# Patient Record
Sex: FEMALE | Race: WHITE | NOT HISPANIC OR LATINO | Employment: FULL TIME | URBAN - METROPOLITAN AREA
[De-identification: names, ages, dates, MRNs, and addresses within clinical notes are randomized per-mention and may not be internally consistent; named-entity substitution may affect disease eponyms.]

---

## 2017-07-28 ENCOUNTER — HOSPITAL ENCOUNTER (OUTPATIENT)
Dept: RADIOLOGY | Facility: OTHER | Age: 62
Discharge: HOME | End: 2017-07-28
Attending: OBSTETRICS & GYNECOLOGY
Payer: COMMERCIAL

## 2017-07-28 VITALS — WEIGHT: 210 LBS | BODY MASS INDEX: 33.75 KG/M2 | HEIGHT: 66 IN

## 2017-07-28 DIAGNOSIS — Z12.31 VISIT FOR SCREENING MAMMOGRAM: ICD-10-CM

## 2017-07-28 PROCEDURE — 77067 SCR MAMMO BI INCL CAD: CPT | Mod: TC

## 2017-07-28 PROCEDURE — 77067 SCR MAMMO BI INCL CAD: CPT | Mod: 26,,, | Performed by: RADIOLOGY

## 2017-07-28 PROCEDURE — 77063 BREAST TOMOSYNTHESIS BI: CPT | Mod: 26,,, | Performed by: RADIOLOGY

## 2023-01-20 DIAGNOSIS — M25.531 RIGHT WRIST PAIN: Primary | ICD-10-CM

## 2023-01-23 ENCOUNTER — TELEPHONE (OUTPATIENT)
Dept: ORTHOPEDICS | Facility: CLINIC | Age: 68
End: 2023-01-23
Payer: MEDICARE

## 2023-01-24 ENCOUNTER — OFFICE VISIT (OUTPATIENT)
Dept: ORTHOPEDICS | Facility: CLINIC | Age: 68
End: 2023-01-24
Payer: MEDICARE

## 2023-01-24 ENCOUNTER — HOSPITAL ENCOUNTER (OUTPATIENT)
Dept: RADIOLOGY | Facility: HOSPITAL | Age: 68
Discharge: HOME | End: 2023-01-24
Attending: PHYSICIAN ASSISTANT
Payer: MEDICARE

## 2023-01-24 DIAGNOSIS — M25.531 RIGHT WRIST PAIN: ICD-10-CM

## 2023-01-24 DIAGNOSIS — M18.11 ARTHRITIS OF CARPOMETACARPAL (CMC) JOINT OF RIGHT THUMB: ICD-10-CM

## 2023-01-24 DIAGNOSIS — M65.4 DE QUERVAIN'S TENOSYNOVITIS, RIGHT: Primary | ICD-10-CM

## 2023-01-24 PROCEDURE — 3288F PR FALLS RISK ASSESSMENT DOCUMENTED: ICD-10-PCS | Mod: CPTII,S$GLB,, | Performed by: PHYSICIAN ASSISTANT

## 2023-01-24 PROCEDURE — 1101F PR PT FALLS ASSESS DOC 0-1 FALLS W/OUT INJ PAST YR: ICD-10-PCS | Mod: CPTII,S$GLB,, | Performed by: PHYSICIAN ASSISTANT

## 2023-01-24 PROCEDURE — 73110 X-RAY EXAM OF WRIST: CPT | Mod: TC,RT

## 2023-01-24 PROCEDURE — 1125F PR PAIN SEVERITY QUANTIFIED, PAIN PRESENT: ICD-10-PCS | Mod: CPTII,S$GLB,, | Performed by: PHYSICIAN ASSISTANT

## 2023-01-24 PROCEDURE — 99203 OFFICE O/P NEW LOW 30 MIN: CPT | Mod: S$GLB,,, | Performed by: PHYSICIAN ASSISTANT

## 2023-01-24 PROCEDURE — 1101F PT FALLS ASSESS-DOCD LE1/YR: CPT | Mod: CPTII,S$GLB,, | Performed by: PHYSICIAN ASSISTANT

## 2023-01-24 PROCEDURE — 99203 PR OFFICE/OUTPT VISIT, NEW, LEVL III, 30-44 MIN: ICD-10-PCS | Mod: S$GLB,,, | Performed by: PHYSICIAN ASSISTANT

## 2023-01-24 PROCEDURE — 99999 PR PBB SHADOW E&M-EST. PATIENT-LVL II: CPT | Mod: PBBFAC,,, | Performed by: PHYSICIAN ASSISTANT

## 2023-01-24 PROCEDURE — 1125F AMNT PAIN NOTED PAIN PRSNT: CPT | Mod: CPTII,S$GLB,, | Performed by: PHYSICIAN ASSISTANT

## 2023-01-24 PROCEDURE — 73110 XR WRIST COMPLETE 3 VIEWS RIGHT: ICD-10-PCS | Mod: 26,RT,, | Performed by: RADIOLOGY

## 2023-01-24 PROCEDURE — 1159F MED LIST DOCD IN RCRD: CPT | Mod: CPTII,S$GLB,, | Performed by: PHYSICIAN ASSISTANT

## 2023-01-24 PROCEDURE — 3288F FALL RISK ASSESSMENT DOCD: CPT | Mod: CPTII,S$GLB,, | Performed by: PHYSICIAN ASSISTANT

## 2023-01-24 PROCEDURE — 73110 X-RAY EXAM OF WRIST: CPT | Mod: 26,RT,, | Performed by: RADIOLOGY

## 2023-01-24 PROCEDURE — 1159F PR MEDICATION LIST DOCUMENTED IN MEDICAL RECORD: ICD-10-PCS | Mod: CPTII,S$GLB,, | Performed by: PHYSICIAN ASSISTANT

## 2023-01-24 PROCEDURE — 99999 PR PBB SHADOW E&M-EST. PATIENT-LVL II: ICD-10-PCS | Mod: PBBFAC,,, | Performed by: PHYSICIAN ASSISTANT

## 2023-01-24 NOTE — PROGRESS NOTES
[facility]  History & Physical  Orthopedics    SUBJECTIVE:      Chief Complaint: Right wrist pain    Referring Provider: Craig Newsome Dr. is the supervising physician for this encounter/patient    History of Present Illness:  Patient is a 67 y.o. right hand dominant female who presents today with complaints of right wrist pain present for 6+ months, no trauma/injury. Pain to radial wrist, worse with gripping/twisting, lifting or pushing through the wrist. 8/10 pain with activity. She did receive a steroid injection to the radial wrist about 6 months ago with outside provider. This was helpful. She has tried Voltaren gel. No bracing. No surgical history on the hands.     Onset of symptoms/DOI was 6+ months.    Symptoms are aggravated by activity and movement.    Symptoms are alleviated by rest.    Symptoms consist of pain.    The patient rates their pain as a 8/10.    Attempted treatment(s) and/or interventions include activity modifications, rest, steroid injection.     The patient denies any fevers, chills, N/V, D/C and presents for evaluation.       No past medical history on file.  Past Surgical History:   Procedure Laterality Date    HYSTERECTOMY      OOPHORECTOMY       Review of patient's allergies indicates:  No Known Allergies  Social History     Social History Narrative    Not on file     No family history on file.    No current outpatient medications on file.      Review of Systems:  Constitutional: no fever or chills  Eyes: no visual changes  ENT: no nasal congestion or sore throat  Respiratory: no cough or shortness of breath  Cardiovascular: no chest pain  Gastrointestinal: no nausea or vomiting, tolerating diet  Musculoskeletal: pain and soreness    OBJECTIVE:      Vital Signs (Most Recent):  There were no vitals filed for this visit.  There is no height or weight on file to calculate BMI.      Physical Exam:  Constitutional: The patient appears well-developed and  well-nourished. No distress.   Skin: No lesions appreciated  Head: Normocephalic and atraumatic.   Nose: Nose normal.   Ears: No deformities seen  Eyes: Conjunctivae and EOM are normal.   Neck: No tracheal deviation present.   Cardiovascular: Normal rate and intact distal pulses.    Pulmonary/Chest: Effort normal. No respiratory distress.   Abdominal: There is no guarding.   Neurological: The patient is alert.   Psychiatric: The patient has a normal mood and affect.     Right Hand/Wrist Examination:    Observation/Inspection:  Swelling  none    Deformity  none  Discoloration  none     Scars   none    Atrophy  none    HAND/WRIST EXAMINATION:  Finkelstein's Test   POS  WHAT Test    POS  Snuff box tenderness   Neg  Bautista's Test    Neg  Hook of Hamate Tenderness  Neg  CMC grind    Neg  Circumduction test   Neg  TTP to the radial styloid    Neurovascular Exam:  Digits WWP, brisk CR < 3s throughout  NVI motor/LTS to M/R/U nerves, radial pulse 2+  Tinel's Test - Carpal Tunnel  Neg  Tinel's Test - Cubital Tunnel  Neg  Phalen's Test    Neg  Median Nerve Compression Test Neg    ROM hand full, painless    ROM wrist full, painless    ROM elbow full, painless    Abdomen not guarded  Respirations nonlabored  Perfusion intact    Diagnostic Results:     Imaging - I independently viewed the patient's imaging as well as the radiology report.      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     Impression:     No fracture dislocation.  Additional findings above.     ASSESSMENT/PLAN:      67 y.o. yo female with Right wrist DeQuervains tendonitis. CMC arthritis on xrays, however appears asymptomatic today.    Plan: The patient and I had a thorough discussion today.  We discussed the working diagnosis as well as several other potential alternative diagnoses.  Treatment options were discussed, both conservative and  surgical.  Conservative treatment options would include things such as activity modifications, workplace modifications, a period of rest, oral vs topical OTC and prescription anti-inflammatory medications, occupational therapy, splinting/bracing, immobilization, corticosteroid injections, and others.  Surgical options were discussed as well.     At this time, the patient would like to proceed with a trial of OT for tendonitis rehab, thumb support brace given today. I do offer repeat CSI today which she declines. Discussed routine voltaren gel massage, ice/heat. RTC on prn basis.    Should the patient's symptoms worsen, persist, or fail to improve they should return for reevaluation and I would be happy to see them back anytime.           Please do not hesitate to reach out to us via email, phone, or MyChart with any questions, concerns, or feedback.

## 2023-01-30 PROBLEM — M65.4 DE QUERVAIN'S TENOSYNOVITIS, RIGHT: Status: ACTIVE | Noted: 2023-01-30

## 2023-01-31 ENCOUNTER — CLINICAL SUPPORT (OUTPATIENT)
Dept: REHABILITATION | Facility: HOSPITAL | Age: 68
End: 2023-01-31
Payer: MEDICARE

## 2023-01-31 DIAGNOSIS — M79.642 CHRONIC PAIN OF LEFT HAND: ICD-10-CM

## 2023-01-31 DIAGNOSIS — M65.4 DE QUERVAIN'S TENOSYNOVITIS, RIGHT: Primary | Chronic | ICD-10-CM

## 2023-01-31 DIAGNOSIS — Z78.9 ALTERATION IN INSTRUMENTAL ACTIVITIES OF DAILY LIVING (IADL): ICD-10-CM

## 2023-01-31 DIAGNOSIS — G89.29 CHRONIC PAIN OF LEFT HAND: ICD-10-CM

## 2023-01-31 PROBLEM — M79.643 CHRONIC HAND PAIN: Status: ACTIVE | Noted: 2023-01-31

## 2023-01-31 PROCEDURE — 97166 OT EVAL MOD COMPLEX 45 MIN: CPT | Mod: PN

## 2023-01-31 NOTE — PATIENT INSTRUCTIONS
- Wear wrist brace during A.M. hours, to be removed for gentle wrist and thumb ROM  - Avoid activities that elicit pain immediately

## 2023-01-31 NOTE — PLAN OF CARE
"  [facility] Occupational Therapy   Hand/Wrist Evaluation      Patient: Chitra Marie  Date of Evaluation: 01/31/2023  Referring Physician: Russo-Digeorge, Jamie L*  Medical Diagnosis: M65.4 (ICD-10-CM) - De Quervain's tenosynovitis, right  Therapy Diagnosis:   Encounter Diagnosis   Name Primary?    De Quervain's tenosynovitis, right      Authorization Expiration Date: TBD  Total Visits Authorized: 1 for evaluation   DOI: Insidious onset of R radial wrist pain for >6 months   Referral Orders: Eval and treat  Plan of Care Certification Period: 1/31/23-3/31/23  Progress Note Due: 2/28/23  FOTO: Initial evaluation     Visit #: 1/1; 6 visits on POC  Start Time: 13:05  End Time: 13:45  Total Billable Time: 40 min    Precautions: standard    Orthopedic Precautions: N/A    No past medical history on file.  No current outpatient medications on file.     No current facility-administered medications for this visit.     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     Impression:     No fracture dislocation.  Additional findings above."    Subjective/Occupational Profile   Age: 67 y.o.  Sex: female    Chitra Marie is a right-hand dominant female who presents to Outpatient Occupational Therapy for evaluation. Pt states, "I started noticing pain near the area where you take your pulse, starting around summer of 2022. It progressively has gotten worse mostly around Thanksgiving. It wasn't pain that I couldn't take- but it just got much worse and was happening more. I have trouble holding cups because of pain ans also pushing myself up from the bath and from a seated position."     Home/Environmental History: Pt resides w/ her son. Pt and son share household management. "     Assistance level to complete ADLs:  Feeding: Modified Independent  Bathing: Modified Independent  Toileting: Modified Independent  Ambulating: Independent  Grooming: Independent  Dressing upper body: Modified Independent  Dressing lower body: Modified Independent  Meal preparation: Modified Independent  Taking/Managing medications: Independent    Work History: Pt is retired.     Driving Status: Pt is able to drive self with (I).     Activity Level: Lightly active     Date/Mechanism of Injury: Insidious onset of R radial wrist pain since approx. Summer 2022    Involved areas: right dorsolateral wrist     Functional Pain Scale Rating 0-10:   At Rest:  1/10   With Activity: 7/10-8/10    Chitra 's goals for therapy are: Decreased pain and Increased functional use      Objective     Hand dominance: right  Observation: Skin intact and dry  Sensation: Radial Nerve Intact    Special Tests:   Finkelstein's Test:  positive (5/10)   Grind Test:  positive (3/10)   ScaphoLunate Ballotment Test: negative    Edema: Circumferential measurements: as follows:   Location: RUE  Proximal Wrist Crease: 16.5 cm  (L: 16 cm)     Range of Motion: right active  B digit and wrist ROM are WNL and symmetrical (B)  Note: 2/10-3/10 pain reported w/ R wrist flexion, extension, UD, and thumb palmar ABD      Manual Muscle Test: deferred at this time  Muscle Strength     Strength: (RANDY Dynamometer in lbs), Average 3 trials, Position II  Right: 37.2 lbs  Left: 35.2 lbs    Pinch Strength: (Pinch Gauge in lbs), Average 3 trials  Lateral/Quinn Pinch       L) 10.3   R) 10.9  2pt Pinch                    L) 6.9     R) 6    Problem List: functional limitations: Patient presents with the following functional limitations:   Self Care / ADL: opening containers, bathing  Work/Activities/Household management tasks: cooking, cleaning   Leisure: ceramics     Treatment     Home Exercise Program/Education:  Issued HEP (see patient instructions in EMR) and  "educated on brace wear & modality use for pain management . Chitra demonstrated good  understanding of the education provided.      Patient/Family Education: role of OT, goals for OT, scheduling/cancellations - Pt verbalized understanding. Discussed insurance limitations with patient.      Outcome Measure: FOTO  CMS Impairment/Limitation/Restriction for FOTO  Upper Extremity Survey    Therapist reviewed FOTO scores for Chitra Marie on 1/31/2023.   FOTO documents entered into EPIC - see Media section.    Limitation Score: 63%  Category: Self Care           History Examination Decision Making Complexity Score   Occupational Profile:   Pt's full occupational profile reviewed , including OD(surgical notes), including report of previous therapies.    Medical and Therapy History:     Please see "Plan" section for reference of therapy goals.    Pt with Hx/o  - See Subjective/Occupational Profile     Brief/expanded review:  Expanded              Performance Deficits     Physical    Please see under "problem list" and the assessment portion of this eval note      Cognitive  WNL      Psychosocial:    Pt with ability to work at this time using just one or both hand.    Rating: mild  Treatment to include :  paraffin, fluidotherapy, manual therapy/joint mobilizations,scar massage,   modalities for pain management, iontophoresis with dexamethasone, US 3mhz, therapeutic exercises/activities,        strengthening,       Clinical decision  Making of moderate  complexity, mild  modifications for required to complete eval      Rating:moderate Moderate in combination of the 3 categories      Problem List:   Decreased function of Right UE, Increased pain, Decreased strength, Inability to perform work/tasks, and Inability to perform leisure activiites    Assessment   Chitra Marie was referred to Outpatient Occupational Therapy with diagnosis of   Encounter Diagnosis   Name Primary?    De Quervain's tenosynovitis, right     " presents with the functional limitations as described in the problem list above. Patient can benefit from Outpatient Occupational Therapy services to maximize functional use of her R hand to facilitate performance of ADLs and IADLs. The following goals were discussed with the Patient and she is in agreement with them as to be addressed in the treatment plan.     Anticipated barriers to Occupational Therapy: None noted     Pt has no cultural, educational or language barriers to learning provided.      ShortTerm Goals (to be met in 3 weeks or by 2/21/23):   1. Patient to be IND and compliant with HEP & attendance for duration of therapy.  2. Patient will report no more than 4/10 pain in R wrist/hand.       Long Term Goals (to be met in 6 weeks or by DC):   1. Patient to be IND and compliant with HEP & attendance for duration of therapy.  2. Patient will report increase in functional use of her R wrist/hand by 25% as evidenced by the FOTO outcome measure.   3. Patient will report no more than 2/10 pain in R wrist/hand.       Plan   Chitra Marie is to be treated by Occupational Therapy 1 time per week for 6 weeks, or 6 visits, during the certification period from 1/31/23 to 3/31/23, to achieve the established goals.       Treatment to include: Paraffin/hot packs, manual therapy/joint mobilizations, modalities for pain management, edema control, joint protection, energy conservation, therapeutic exercises, therapeutic activities, and implementation of a personalized Home Exercise Program (HEP), as well as any other treatments deemed necessary based on the patient's needs or progress.       Thank you for allowing me to assist in the care of your Patient. If you have any questions or concerns, please don't hesitate to e-mail or contact me directly.      MARY Palacios/L  [facility]   Phone: [phone]  Fax: [phone]

## 2023-02-03 ENCOUNTER — CLINICAL SUPPORT (OUTPATIENT)
Dept: REHABILITATION | Facility: HOSPITAL | Age: 68
End: 2023-02-03
Payer: MEDICARE

## 2023-02-03 DIAGNOSIS — Z78.9 ALTERATION IN INSTRUMENTAL ACTIVITIES OF DAILY LIVING (IADL): ICD-10-CM

## 2023-02-03 DIAGNOSIS — G89.29 CHRONIC PAIN OF RIGHT HAND: ICD-10-CM

## 2023-02-03 DIAGNOSIS — M65.4 DE QUERVAIN'S TENOSYNOVITIS, RIGHT: Primary | ICD-10-CM

## 2023-02-03 DIAGNOSIS — M79.641 CHRONIC PAIN OF RIGHT HAND: ICD-10-CM

## 2023-02-03 PROCEDURE — 97035 APP MDLTY 1+ULTRASOUND EA 15: CPT | Mod: PN

## 2023-02-03 PROCEDURE — 97140 MANUAL THERAPY 1/> REGIONS: CPT | Mod: PN

## 2023-02-03 PROCEDURE — 97530 THERAPEUTIC ACTIVITIES: CPT | Mod: PN

## 2023-02-03 PROCEDURE — 97018 PARAFFIN BATH THERAPY: CPT | Mod: PN,59

## 2023-02-04 NOTE — PROGRESS NOTES
"  [facility]  Occupational Therapy Daily Treatment Note    Date: 2/6/2023  Name: Chitra Marie  Clinic Number: [id number]    Therapy Diagnosis:   Encounter Diagnoses   Name Primary?    De Quervain's tenosynovitis, right Yes    Alteration in instrumental activities of daily living (IADL)     Chronic pain of right hand      Physician: Russo-Digeorge, Jamie L*    Physician Orders: Eval and treat   Medical Diagnosis: M65.4 (ICD-10-CM) - De Quervain's tenosynovitis, right  Date of Injury/Onset: Insidious onset of R radial wrist pain for >6 months   Evaluation Date: 1/31/23  Insurance Authorization Period Expiration: 2/28/23  Plan of Care Expiration: 1/31/23-3/31/23  Progress Note Due: 2/28/23   Date of Return to MD: TBD  Visit # / Visits authorized: 2/12 authorized visits / 6 visits on POC   FOTO: initial evaluation     Precautions:  Standard  Orthopedic Precautions: N/A    Time In: 13:49  Time Out: 14:35  Total Billable Time: 46 minutes      SUBJECTIVE     Pt reports: "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 I had some mild pain with the painting portion, it hurt a little more when I was scraping."     She was compliant with home exercise program given last session.     Response to previous treatment: favorable     Functional change: Increased ability to perform leisure activities with less pain     Pain: 1/10  Location: right dorsolateral wrist     OBJECTIVE   Objective Measures updated at progress report unless specified.    Edema: Circumferential measurements: as follows:   Location: RUE  Proximal Wrist Crease: 16.5 cm  (L: 16 cm)      Range of Motion: right active  B digit and wrist ROM are WNL and symmetrical (B)  Note: 2/10-3/10 pain reported w/ R wrist flexion, extension, UD, and thumb palmar ABD        Manual Muscle Test: deferred at this time  Muscle Strength      Strength: (RANDY Dynamometer in lbs), Average 3 trials, Position II  Right: 37.2 lbs  Left: 35.2 lbs     Pinch Strength: " (Pinch Gauge in lbs), Average 3 trials  Lateral/Quinn Pinch       L) 10.3   R) 10.9  2pt Pinch                    L) 6.9     R) 6      Treatment     Chitra received the following treatments listed below:     Supervised modalities after being cleared for contradictions for 7 minutes:   -Paraffin bath to R hand/wrist to increase blood flow, circulation, pain management and for tissue elasticity prior to therex.       Direct contact modalities after being cleared for contraindications for 8 minutes:  -Utrasound for scar tissue remodeling, increased circulation, & tissue elasticity @ 100% duty cycle, 3.3 Mhz, applied to R dorsolateral wrist/forearm, intensity = 0.6 w/cm2       Manual therapy techniques to increase joint mobilization and soft tissue mobilization for 23 minutes:   -Soft tissue massage (STM) and myofascial release (MFR) to R orearm/wrist/hand, Grade I/II joint mobs to wrist joint for 23 minutes to increase joint mobility, ROM and for pain management.       Therapeutic activities/exercises to restore functional performance while increasing strength, endurance, ROM, and flexibility for 8 minutes, including:  --Kinesiotape applied to R hand/wrist/forearm in order to decrease dorsolateral wrist pain; 2 I cuts; 50%-75% tension; anchored at dorsal thumb P2 and lateral elbow/proximal forearm. Pt educated that tape may be left on for five days, can shower with tape. Remove with cold water and/or lotion/oil.  - Review of home program     Patient Education and Home Exercises      Education provided:   - Importance of compliance w/ HEP to maximize functional gains   - Progress towards goals     Written Home Exercises Provided: yes.  Exercises were reviewed and Chitra was able to demonstrate them prior to the end of the session.  Chitra demonstrated good  understanding of the HEP provided. See EMR under Patient Instructions for exercises provided during previous therapy sessions:     ASSESSMENT     Pt tolerated  session well, noting decreased overall pain levels reported since last session with increased ability noted to perform leisure activities with less pain.      Chitra is progressing well towards her goals and there are no updates to goals at this time. Pt prognosis is Good.     Pt will continue to benefit from skilled Outpatient Occupational Therapy to address the deficits listed in the problem list on initial evaluation, provide Pt/family education and to maximize Pt's level of independence in the home and community environment.     Pt's spiritual, cultural and educational needs considered and Pt agreeable to Plan of Care and goals.    Anticipated barriers to occupational therapy: None noted       ShortTerm Goals (to be met in 3 weeks or by 2/21/23):   1. Patient to be IND and compliant with HEP & attendance for duration of therapy.  2. Patient will report no more than 4/10 pain in R wrist/hand.         Long Term Goals (to be met in 6 weeks or by DC):   1. Patient to be IND and compliant with HEP & attendance for duration of therapy.  2. Patient will report increase in functional use of her R wrist/hand by 25% as evidenced by the FOTO outcome measure.   3. Patient will report no more than 2/10 pain in R wrist/hand.            PLAN     Continue Skilled Occupational Therapy with individualized Plan of Care to address the above outlined treatment goals for 1 time per week for 6 weeks, or 6 visits, during the certification period from 1/31/23 to 3/31/23.       Updates/Grading for next session: TBD pending progress     YISEL Palacios

## 2023-02-06 ENCOUNTER — CLINICAL SUPPORT (OUTPATIENT)
Dept: REHABILITATION | Facility: HOSPITAL | Age: 68
End: 2023-02-06
Payer: MEDICARE

## 2023-02-06 DIAGNOSIS — M79.641 CHRONIC PAIN OF RIGHT HAND: ICD-10-CM

## 2023-02-06 DIAGNOSIS — Z78.9 ALTERATION IN INSTRUMENTAL ACTIVITIES OF DAILY LIVING (IADL): ICD-10-CM

## 2023-02-06 DIAGNOSIS — G89.29 CHRONIC PAIN OF RIGHT HAND: ICD-10-CM

## 2023-02-06 DIAGNOSIS — M65.4 DE QUERVAIN'S TENOSYNOVITIS, RIGHT: Primary | ICD-10-CM

## 2023-02-06 PROCEDURE — 97035 APP MDLTY 1+ULTRASOUND EA 15: CPT | Mod: PN

## 2023-02-06 PROCEDURE — 97140 MANUAL THERAPY 1/> REGIONS: CPT | Mod: PN

## 2023-02-06 PROCEDURE — 97530 THERAPEUTIC ACTIVITIES: CPT | Mod: PN

## 2023-02-06 PROCEDURE — 97018 PARAFFIN BATH THERAPY: CPT | Mod: PN,59

## 2023-02-10 NOTE — PROGRESS NOTES
"  [facility]  Occupational Therapy Daily Treatment Note    Date: 2/13/2023  Name: Chitra Marie  Clinic Number: [id number]    Therapy Diagnosis:   Encounter Diagnoses   Name Primary?    De Quervain's tenosynovitis, right Yes    Alteration in instrumental activities of daily living (IADL)     Chronic pain of right hand      Physician: Russo-Digeorge, Jamie L*    Physician Orders: Eval and treat   Medical Diagnosis: M65.4 (ICD-10-CM) - De Quervain's tenosynovitis, right  Date of Injury/Onset: Insidious onset of R radial wrist pain for >6 months   Evaluation Date: 1/31/23  Insurance Authorization Period Expiration: 2/28/23  Plan of Care Expiration: 1/31/23-3/31/23; 6 visits on POC   Progress Note Due: 2/28/23   Date of Return to MD: TBD  Visit # / Visits authorized: 3/12 authorized visits  FOTO: initial evaluation     Precautions:  Standard  Orthopedic Precautions: N/A    Time In: 13:45  Time Out: 14:35  Total Billable Time: 50 minutes      SUBJECTIVE     Pt reports: "This week has been a little more increased pain than the weeks before. 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     Response to previous treatment: N/A    Functional change: Increased ability to perform leisure activities with less pain     Pain: 1/10  Location: right dorsolateral wrist     OBJECTIVE   Objective Measures updated at progress report unless specified.    Edema: Circumferential measurements: as follows:   Location: RUE  Proximal Wrist Crease: 16.5 cm  (L: 16 cm)      Range of Motion: right active  B digit and wrist ROM are WNL and symmetrical (B)  Note: 2/10-3/10 pain reported w/ R wrist flexion, extension, UD, and thumb palmar ABD        Manual Muscle Test: deferred at this time  Muscle Strength      Strength: (RANDY Dynamometer in lbs), Average 3 trials, Position II  Right: 37.2 lbs  Left: 35.2 lbs     Pinch Strength: (Pinch Gauge in lbs), " Average 3 trials  Lateral/Quinn Pinch       L) 10.3   R) 10.9  2pt Pinch                    L) 6.9     R) 6      Treatment     Chitra received the following treatments listed below:     Supervised modalities after being cleared for contradictions for 7 minutes:   -Paraffin bath to R hand/wrist to increase blood flow, circulation, pain management and for tissue elasticity prior to therex.       Direct contact modalities after being cleared for contraindications for 8 minutes:  -Utrasound for scar tissue remodeling, increased circulation, & tissue elasticity @ 100% duty cycle, 3.3 Mhz, applied to R dorsolateral wrist/forearm, intensity = 0.6 w/cm2       Manual therapy techniques to increase joint mobilization and soft tissue mobilization for 23 minutes:   -Soft tissue massage (STM) and myofascial release (MFR) to R orearm/wrist/hand, Grade I/II joint mobs to wrist joint to increase joint mobility, ROM and for pain management.       Therapeutic activities/exercises to restore functional performance while increasing strength, endurance, ROM, and flexibility for 12 minutes, including:  -Review of joint protection and energy conservation methods   - Education on difference between SSRN impingment vs. DeQuervain's tenosynovitis    Patient Education and Home Exercises      Education provided:   - Importance of compliance w/ HEP to maximize functional gains   - Progress towards goals     Written Home Exercises Provided: Continued w/ previous HEP.   Exercises were reviewed and Chitra was able to demonstrate them prior to the end of the session.  Chitra demonstrated good  understanding of the HEP provided. See EMR under Patient Instructions for exercises provided during previous therapy sessions:     ASSESSMENT     Pt tolerated session well, noting mild increase in dorsolateral wrist/thumb pain reported since last week, however noting that she has continued to perform leisure tasks such as ceramics with concurrent pain.         Chitra is progressing well towards her goals and there are no updates to goals at this time. Pt prognosis is Good.     Pt will continue to benefit from skilled Outpatient Occupational Therapy to address the deficits listed in the problem list on initial evaluation, provide Pt/family education and to maximize Pt's level of independence in the home and community environment.     Pt's spiritual, cultural and educational needs considered and Pt agreeable to Plan of Care and goals.    Anticipated barriers to occupational therapy: None noted       ShortTerm Goals (to be met in 3 weeks or by 2/21/23):   1. Patient to be IND and compliant with HEP & attendance for duration of therapy.  2. Patient will report no more than 4/10 pain in R wrist/hand.         Long Term Goals (to be met in 6 weeks or by DC):   1. Patient to be IND and compliant with HEP & attendance for duration of therapy.  2. Patient will report increase in functional use of her R wrist/hand by 25% as evidenced by the FOTO outcome measure.   3. Patient will report no more than 2/10 pain in R wrist/hand.            PLAN     Continue Skilled Occupational Therapy with individualized Plan of Care to address the above outlined treatment goals for 1 time per week for 6 weeks, or 6 visits, during the certification period from 1/31/23 to 3/31/23.       Updates/Grading for next session: TBD pending progress     YISEL Palacios

## 2023-02-13 ENCOUNTER — CLINICAL SUPPORT (OUTPATIENT)
Dept: REHABILITATION | Facility: HOSPITAL | Age: 68
End: 2023-02-13
Payer: MEDICARE

## 2023-02-13 DIAGNOSIS — G89.29 CHRONIC PAIN OF RIGHT HAND: ICD-10-CM

## 2023-02-13 DIAGNOSIS — Z78.9 ALTERATION IN INSTRUMENTAL ACTIVITIES OF DAILY LIVING (IADL): ICD-10-CM

## 2023-02-13 DIAGNOSIS — M79.641 CHRONIC PAIN OF RIGHT HAND: ICD-10-CM

## 2023-02-13 DIAGNOSIS — M65.4 DE QUERVAIN'S TENOSYNOVITIS, RIGHT: Primary | ICD-10-CM

## 2023-02-13 PROCEDURE — 97530 THERAPEUTIC ACTIVITIES: CPT | Mod: PN

## 2023-02-13 PROCEDURE — 97140 MANUAL THERAPY 1/> REGIONS: CPT | Mod: PN

## 2023-02-13 PROCEDURE — 97018 PARAFFIN BATH THERAPY: CPT | Mod: PN

## 2023-02-13 PROCEDURE — 97035 APP MDLTY 1+ULTRASOUND EA 15: CPT | Mod: PN

## 2023-02-23 NOTE — PROGRESS NOTES
"  [facility]  Occupational Therapy Progress & Daily Treatment Note    Date: 2/27/2023  Name: Chitra Marie  Clinic Number: [id number]    Therapy Diagnosis:   Encounter Diagnoses   Name Primary?    De Quervain's tenosynovitis, right Yes    Alteration in instrumental activities of daily living (IADL)     Chronic pain of right hand      Physician: Russo-Digeorge, Jamie L*    Physician Orders: Eval and treat   Medical Diagnosis: M65.4 (ICD-10-CM) - De Quervain's tenosynovitis, right  Date of Injury/Onset: Insidious onset of R radial wrist pain for >6 months   Evaluation Date: 1/31/23  Insurance Authorization Period Expiration: 2/28/23  Plan of Care Expiration: 1/31/23-3/31/23; 6 visits on POC   Progress Note Due: 2/28/23   Date of Return to MD: TBD  Visit # / Visits authorized: 4/12 authorized visits  FOTO: initial evaluation, 5th visit    Precautions:  Standard  Orthopedic Precautions: N/A    Time In: 13:50  Time Out: 14:30  Total Billable Time: 40 minutes      SUBJECTIVE     Pt reports: "My wrist has been hurting a bit more the past week. I feel a little bump near where my wrist and thumb meet up. I've been wearing the brace- actually both day and night- I know you just said to wear it during the day, but I felt like one night it felt better to wear it at night- so I've been wearing every night since."     She was compliant with home exercise program given last session.     Response to previous treatment: N/A    Functional change: Increased ability to perform leisure activities with less pain     Pain: 2/10  Location: right dorsolateral wrist     OBJECTIVE   Objective Measures updated at progress report unless specified.    Edema: Circumferential measurements: as follows:   Location: RUE  Proximal Wrist Crease: 16.5 cm  (L: 16 cm)      Range of Motion: right active  B digit and wrist ROM are WNL and symmetrical (B)  Note: 2/10-3/10 pain reported w/ R wrist flexion, extension, UD, and " thumb palmar ABD        Manual Muscle Test: deferred at this time  Muscle Strength      Strength: (RANDY Dynamometer in lbs), Average 3 trials, Position II  Right: 33.5 lbs (-4 lbs)  Left: 35.2 lbs    Note: increased dorsolateral wrist pain reported with  testing      Pinch Strength: (Pinch Gauge in lbs), Average 3 trials  Lateral/Quinn Pinch       L) 10.3   R) 10.9  2pt Pinch                    L) 6.9     R) 6      Treatment     Chitra received the following treatments listed below:     Re-Assessment performed x 23 min       Direct contact modalities after being cleared for contraindications for 8 minutes:  -Utrasound for scar tissue remodeling, increased circulation, & tissue elasticity @ 100% duty cycle, 3.3 Mhz, applied to R dorsolateral wrist/forearm, intensity = 0.6 w/cm2       Manual therapy techniques to increase joint mobilization and soft tissue mobilization for 9 minutes:   -Soft tissue massage (STM) and myofascial release (MFR) to R orearm/wrist/hand, Grade I/II joint mobs to wrist joint to increase joint mobility, ROM and for pain management.       Patient Education and Home Exercises      Education provided:   - Importance of compliance w/ HEP to maximize functional gains   - Kinesiotape application to L dorsal wrist to assist with support of joint and pain     Written Home Exercises Provided: Continued w/ previous HEP.   Exercises were reviewed and Chitra was able to demonstrate them prior to the end of the session.  Chitra demonstrated good  understanding of the HEP provided. See EMR under Patient Instructions for exercises provided during previous therapy sessions:     ASSESSMENT     Pt tolerated session well, noting slight increase in pain reported over the past 2 weeks, w/ partial compliance noted with brace wear. P   Chitra is progressing slowly towards her goals and updates to goals at this time are noted below. Pt prognosis is Good.     Pt will continue to benefit from skilled  Outpatient Occupational Therapy to address the deficits listed in the problem list on initial evaluation, provide Pt/family education and to maximize Pt's level of independence in the home and community environment.     Pt's spiritual, cultural and educational needs considered and Pt agreeable to Plan of Care and goals.    Anticipated barriers to occupational therapy: None noted       ShortTerm Goals (to be met in 3 weeks or by 2/21/23):   1. Patient to be IND and compliant with HEP & attendance for duration of therapy. In progress   2. Patient will report no more than 4/10 pain in R wrist/hand. Goal Met 2/27        Long Term Goals (to be met in 6 weeks or by DC):   1. Patient to be IND and compliant with HEP & attendance for duration of therapy.  2. Patient will report increase in functional use of her R wrist/hand by 25% as evidenced by the FOTO outcome measure.   3. Patient will report no more than 2/10 pain in R wrist/hand.      PLAN     Continue Skilled Occupational Therapy with individualized Plan of Care to address the above outlined treatment goals for 1 time per week for 6 weeks, or 6 visits, during the certification period from 1/31/23 to 3/31/23.       Updates/Grading for next session: TBD pending progress       YISEL Palacios

## 2023-02-27 ENCOUNTER — CLINICAL SUPPORT (OUTPATIENT)
Dept: REHABILITATION | Facility: HOSPITAL | Age: 68
End: 2023-02-27
Payer: MEDICARE

## 2023-02-27 DIAGNOSIS — Z78.9 ALTERATION IN INSTRUMENTAL ACTIVITIES OF DAILY LIVING (IADL): ICD-10-CM

## 2023-02-27 DIAGNOSIS — M65.4 DE QUERVAIN'S TENOSYNOVITIS, RIGHT: Primary | ICD-10-CM

## 2023-02-27 DIAGNOSIS — G89.29 CHRONIC PAIN OF RIGHT HAND: ICD-10-CM

## 2023-02-27 DIAGNOSIS — M79.641 CHRONIC PAIN OF RIGHT HAND: ICD-10-CM

## 2023-02-27 PROCEDURE — 97140 MANUAL THERAPY 1/> REGIONS: CPT | Mod: PN

## 2023-02-27 PROCEDURE — 97530 THERAPEUTIC ACTIVITIES: CPT | Mod: PN

## 2023-02-27 PROCEDURE — 97035 APP MDLTY 1+ULTRASOUND EA 15: CPT | Mod: PN

## 2023-03-05 NOTE — PROGRESS NOTES
"  [facility]  Occupational Therapy Daily Treatment Note    Date: 3/6/2023  Name: Chitra Marie  Clinic Number: [id number]    Therapy Diagnosis:   Encounter Diagnoses   Name Primary?    De Quervain's tenosynovitis, right Yes    Alteration in instrumental activities of daily living (IADL)     Chronic pain of right hand      Physician: Russo-Digeorge, Jamie L*    Physician Orders: Eval and treat   Medical Diagnosis: M65.4 (ICD-10-CM) - De Quervain's tenosynovitis, right  Date of Injury/Onset: Insidious onset of R radial wrist pain for >6 months   Evaluation Date: 1/31/23  Insurance Authorization Period Expiration: 6/30/23  Plan of Care Expiration: 1/31/23-3/31/23; 6 visits on POC   Progress Note Due: 2/28/23   Date of Return to MD: TBD  Visit # / Visits authorized: 5/24 authorized visits  FOTO: initial evaluation, 5th visit    Precautions:  Standard  Orthopedic Precautions: N/A    Time In: 13:50  Time Out: 14:38  Total Billable Time: 48 minutes      SUBJECTIVE     Pt reports: "My thumb/wrist is feeling a little better. I've been wearing the brace during daytime."     She was compliant with home exercise program given last session.     Response to previous treatment: N/A    Functional change: Increased ability to perform leisure activities with less pain     Pain: 3/10  Location: right dorsolateral wrist     OBJECTIVE   Objective Measures updated at progress report unless specified.    Treatment     Chitra received the following treatments listed below:    Supervised modalities after being cleared for contradictions for 8 minutes:   -Moist heat application to R forearm/wrist/hand to facilitate tissue extensibility & ROM prior to therex    Direct contact modalities after being cleared for contraindications for 8 minutes:  -Utrasound for scar tissue remodeling, increased circulation, & tissue elasticity @ 100% duty cycle, 3.3 Mhz, applied to R dorsolateral wrist/forearm, intensity = 0.6 " w/cm2       Manual therapy techniques to increase joint mobilization and soft tissue mobilization for 23 minutes:   -Soft tissue massage (STM) and myofascial release (MFR) to R orearm/wrist/hand, Grade I/II joint mobs to wrist joint to increase joint mobility, ROM and for pain management.       Therapeutic activities/exercises to restore functional performance while increasing strength, endurance, ROM, and flexibility for 9 minutes, including:  - Wrist stabilization exercises: isometric wrist flex/ext/RD/UD w/ 1# free weight x 2 min each  - Rest breaks throughout  - Education on difference between SSRN impingment vs. DeQuervain's tenosynovitis       Patient Education and Home Exercises      Education provided:   - Importance of compliance w/ HEP to maximize functional gains   - Kinesiotape application to L dorsal wrist to assist with support of joint and pain     Written Home Exercises Provided: Continued w/ previous HEP.   Exercises were reviewed and Chitra was able to demonstrate them prior to the end of the session.  Chitra demonstrated good  understanding of the HEP provided. See EMR under Patient Instructions for exercises provided during previous therapy sessions:     ASSESSMENT     Pt tolerated session well, noting mild decrease in pain levels since last week, and improved compliance w/ brace wear.   Chitra is progressing slowly towards her goals and there are no updates to goals at this time. Pt prognosis is Good.     Pt will continue to benefit from skilled Outpatient Occupational Therapy to address the deficits listed in the problem list on initial evaluation, provide Pt/family education and to maximize Pt's level of independence in the home and community environment.     Pt's spiritual, cultural and educational needs considered and Pt agreeable to Plan of Care and goals.    Anticipated barriers to occupational therapy: None noted       ShortTerm Goals (to be met in 3 weeks or by 2/21/23):   1.  Patient to be IND and compliant with HEP & attendance for duration of therapy. In progress   2. Patient will report no more than 4/10 pain in R wrist/hand. Goal Met 2/27        Long Term Goals (to be met in 6 weeks or by DC):   1. Patient to be IND and compliant with HEP & attendance for duration of therapy.  2. Patient will report increase in functional use of her R wrist/hand by 25% as evidenced by the FOTO outcome measure.   3. Patient will report no more than 2/10 pain in R wrist/hand.      PLAN     Continue Skilled Occupational Therapy with individualized Plan of Care to address the above outlined treatment goals for 1 time per week for 6 weeks, or 6 visits, during the certification period from 1/31/23 to 3/31/23.       Updates/Grading for next session: TBD pending progress       YISEL Palacios

## 2023-03-06 ENCOUNTER — CLINICAL SUPPORT (OUTPATIENT)
Dept: REHABILITATION | Facility: HOSPITAL | Age: 68
End: 2023-03-06
Payer: MEDICARE

## 2023-03-06 DIAGNOSIS — M65.4 DE QUERVAIN'S TENOSYNOVITIS, RIGHT: Primary | ICD-10-CM

## 2023-03-06 DIAGNOSIS — G89.29 CHRONIC PAIN OF RIGHT HAND: ICD-10-CM

## 2023-03-06 DIAGNOSIS — Z78.9 ALTERATION IN INSTRUMENTAL ACTIVITIES OF DAILY LIVING (IADL): ICD-10-CM

## 2023-03-06 DIAGNOSIS — M79.641 CHRONIC PAIN OF RIGHT HAND: ICD-10-CM

## 2023-03-06 PROCEDURE — 97010 HOT OR COLD PACKS THERAPY: CPT | Mod: PN

## 2023-03-06 PROCEDURE — 97530 THERAPEUTIC ACTIVITIES: CPT | Mod: PN

## 2023-03-06 PROCEDURE — 97140 MANUAL THERAPY 1/> REGIONS: CPT | Mod: PN

## 2023-03-06 PROCEDURE — 97035 APP MDLTY 1+ULTRASOUND EA 15: CPT | Mod: PN

## 2023-03-10 NOTE — PROGRESS NOTES
"  [facility]  Occupational Therapy Daily Treatment Note    Date: 3/13/2023  Name: Chitra Marie  Clinic Number: [id number]    Therapy Diagnosis:   Encounter Diagnoses   Name Primary?    De Quervain's tenosynovitis, right Yes    Alteration in instrumental activities of daily living (IADL)     Chronic pain of right hand      Physician: Russo-Digeorge, Jamie L*    Physician Orders: Eval and treat   Medical Diagnosis: M65.4 (ICD-10-CM) - De Quervain's tenosynovitis, right  Date of Injury/Onset: Insidious onset of R radial wrist pain for >6 months   Evaluation Date: 1/31/23  Insurance Authorization Period Expiration: 6/30/23  Plan of Care Expiration: 1/31/23-3/31/23; 6 visits on POC   Progress Note Due: 3/31/23   Date of Return to MD: TBD  Visit # / Visits authorized: 6/24 authorized visits  FOTO: initial evaluation, 5th visit    Precautions:  Standard  Orthopedic Precautions: N/A    Time In: 13:51  Time Out: 14:37  Total Billable Time: 46 minutes      SUBJECTIVE     Pt reports: "I woke up last Wednesday with no pain- and just had some stiffness more through the top. I had ceramics this morning. I'm starting to think I need to get the little nodule removed."     She was compliant with home exercise program given last session.     Response to previous treatment: N/A    Functional change: Increased ability to perform leisure activities with less pain     Pain: 2/10  Location: right dorsolateral wrist     OBJECTIVE   Objective Measures updated at progress report unless specified.    Treatment     Chitra received the following treatments listed below:    Supervised modalities after being cleared for contradictions for 7 minutes:   -Moist heat application to R forearm/wrist/hand to facilitate tissue extensibility & ROM prior to therex    Direct contact modalities after being cleared for contraindications for 8 minutes:  -Utrasound for scar tissue remodeling, increased circulation, & tissue " elasticity @ 100% duty cycle, 3.3 Mhz, applied to R dorsolateral wrist/forearm, intensity = 0.6 w/cm2       Manual therapy techniques to increase joint mobilization and soft tissue mobilization for 23 minutes:   -Soft tissue massage (STM) and myofascial release (MFR) to R orearm/wrist/hand, Grade I/II joint mobs to wrist joint to increase joint mobility, ROM and for pain management.       Therapeutic activities/exercises (as part of HEP) to restore functional performance while increasing strength, endurance, ROM, and flexibility for 8 minutes, including:  - Wrist stabilization exercises: isometric wrist flex/ext/RD/UD w/ 1# free weight x 2 min each  - Rest breaks throughout       Patient Education and Home Exercises      Education provided:   - Importance of compliance w/ HEP to maximize functional gains     Written Home Exercises Provided: Continued w/ previous HEP.   Exercises were reviewed and Chitra was able to demonstrate them prior to the end of the session.  Chitra demonstrated good  understanding of the HEP provided. See EMR under Patient Instructions for exercises provided during previous therapy sessions.     ASSESSMENT     Pt tolerated session well, noting mild pain reported through dorsal wrist only, w/ majority of localized pain noted at small, bony spur site on dorsolateral wrist. Chitra is progressing slowly towards her goals and there are no updates to goals at this time. Pt prognosis is Good.     Pt will continue to benefit from skilled Outpatient Occupational Therapy to address the deficits listed in the problem list on initial evaluation, provide Pt/family education and to maximize Pt's level of independence in the home and community environment.     Pt's spiritual, cultural and educational needs considered and Pt agreeable to Plan of Care and goals.    Anticipated barriers to occupational therapy: None noted       ShortTerm Goals (to be met in 3 weeks or by 2/21/23):   1. Patient to be  IND and compliant with HEP & attendance for duration of therapy. In progress   2. Patient will report no more than 4/10 pain in R wrist/hand. Goal Met 2/27        Long Term Goals (to be met in 6 weeks or by DC):   1. Patient to be IND and compliant with HEP & attendance for duration of therapy.  2. Patient will report increase in functional use of her R wrist/hand by 25% as evidenced by the FOTO outcome measure.   3. Patient will report no more than 2/10 pain in R wrist/hand.      PLAN     Continue Skilled Occupational Therapy with individualized Plan of Care to address the above outlined treatment goals for 1 time per week for 6 weeks, or 6 visits, during the certification period from 1/31/23 to 3/31/23.       Updates/Grading for next session: TBD pending progress       YISEL Palacios

## 2023-03-13 ENCOUNTER — CLINICAL SUPPORT (OUTPATIENT)
Dept: REHABILITATION | Facility: HOSPITAL | Age: 68
End: 2023-03-13
Payer: MEDICARE

## 2023-03-13 DIAGNOSIS — G89.29 CHRONIC PAIN OF RIGHT HAND: ICD-10-CM

## 2023-03-13 DIAGNOSIS — M65.4 DE QUERVAIN'S TENOSYNOVITIS, RIGHT: Primary | ICD-10-CM

## 2023-03-13 DIAGNOSIS — M79.641 CHRONIC PAIN OF RIGHT HAND: ICD-10-CM

## 2023-03-13 DIAGNOSIS — Z78.9 ALTERATION IN INSTRUMENTAL ACTIVITIES OF DAILY LIVING (IADL): ICD-10-CM

## 2023-03-13 PROCEDURE — 97140 MANUAL THERAPY 1/> REGIONS: CPT | Mod: PN

## 2023-03-13 PROCEDURE — 97035 APP MDLTY 1+ULTRASOUND EA 15: CPT | Mod: PN

## 2023-03-13 PROCEDURE — 97010 HOT OR COLD PACKS THERAPY: CPT | Mod: PN

## 2023-03-13 PROCEDURE — 97530 THERAPEUTIC ACTIVITIES: CPT | Mod: PN

## 2023-03-17 NOTE — PROGRESS NOTES
"  [facility]  Occupational Therapy Daily Treatment Note    Date: 3/20/2023  Name: Chitra Marie  Clinic Number: [id number]    Therapy Diagnosis:   Encounter Diagnoses   Name Primary?    De Quervain's tenosynovitis, right Yes    Alteration in instrumental activities of daily living (IADL)     Chronic pain of right hand      Physician: Russo-Digeorge, Jamie L*    Physician Orders: Eval and treat   Medical Diagnosis: M65.4 (ICD-10-CM) - De Quervain's tenosynovitis, right  Date of Injury/Onset: Insidious onset of R radial wrist pain for >6 months   Evaluation Date: 1/31/23  Insurance Authorization Period Expiration: 6/30/23  Plan of Care Expiration: 1/31/23-3/31/23; 6 visits on POC   Progress Note Due: 3/31/23   Date of Return to MD: TBD  Visit # / Visits authorized: 7/24 authorized visits  FOTO: initial evaluation, 5th visit    Precautions:  Standard  Orthopedic Precautions: N/A    Time In: 13:50  Time Out: 14:35  Total Billable Time: 45 minutes      SUBJECTIVE     Pt reports: "The pain pretty much stays around a  2 or 3- I get random little sharp pains with certain things."     She was compliant with home exercise program given last session.     Response to previous treatment: N/A    Functional change: Increased ability to perform leisure activities with less pain     Pain: 2/10  Location: right dorsolateral wrist     OBJECTIVE     Objective Measures updated at progress report unless specified.    Treatment     Chitra received the following treatments listed below:    Supervised modalities after being cleared for contradictions for 6 minutes:   -Moist heat application to R forearm/wrist/hand to facilitate tissue extensibility & ROM prior to therex    Direct contact modalities after being cleared for contraindications for 8 minutes:  -Utrasound for scar tissue remodeling, increased circulation, & tissue elasticity @ 100% duty cycle, 3.3 Mhz, applied to R dorsolateral wrist/forearm, " intensity = 0.6 w/cm2       Manual therapy techniques to increase joint mobilization and soft tissue mobilization for 23 minutes:   -Soft tissue massage (STM) and myofascial release (MFR) to R orearm/wrist/hand, Grade I/II joint mobs to wrist joint to increase joint mobility, ROM and for pain management.       Therapeutic activities/exercises (as part of HEP) to restore functional performance while increasing strength, endurance, ROM, and flexibility for 8 minutes, including:  - Wrist stabilization exercises: isometric wrist flex/ext/RD/UD w/ 1# free weight x 2 min each  - Rest breaks throughout       Patient Education and Home Exercises      Education provided:   - Importance of compliance w/ HEP to maximize functional gains     Written Home Exercises Provided: Continued w/ previous HEP.   Exercises were reviewed and Chitra was able to demonstrate them prior to the end of the session.  Chitra demonstrated good  understanding of the HEP provided. See EMR under Patient Instructions for exercises provided during previous therapy sessions.     ASSESSMENT     Pt tolerated session well, noting mild pain reported through dorsal wrist only, w/ majority of localized pain noted at small, bony spur site on dorsolateral wrist. Chitra is progressing slowly towards her goals and there are no updates to goals at this time. Pt prognosis is Good.     Pt will continue to benefit from skilled Outpatient Occupational Therapy to address the deficits listed in the problem list on initial evaluation, provide Pt/family education and to maximize Pt's level of independence in the home and community environment.     Pt's spiritual, cultural and educational needs considered and Pt agreeable to Plan of Care and goals.    Anticipated barriers to occupational therapy: None noted       ShortTerm Goals (to be met in 3 weeks or by 2/21/23):   1. Patient to be IND and compliant with HEP & attendance for duration of therapy. In progress   2.  Patient will report no more than 4/10 pain in R wrist/hand. Goal Met 2/27        Long Term Goals (to be met in 6 weeks or by DC):   1. Patient to be IND and compliant with HEP & attendance for duration of therapy.  2. Patient will report increase in functional use of her R wrist/hand by 25% as evidenced by the FOTO outcome measure.   3. Patient will report no more than 2/10 pain in R wrist/hand.      PLAN     Continue Skilled Occupational Therapy with individualized Plan of Care to address the above outlined treatment goals for 1 time per week for 6 weeks, or 6 visits, during the certification period from 1/31/23 to 3/31/23.       Updates/Grading for next session: TBD pending progress       YISEL Palacios

## 2023-03-20 ENCOUNTER — CLINICAL SUPPORT (OUTPATIENT)
Dept: REHABILITATION | Facility: HOSPITAL | Age: 68
End: 2023-03-20
Payer: MEDICARE

## 2023-03-20 DIAGNOSIS — Z78.9 ALTERATION IN INSTRUMENTAL ACTIVITIES OF DAILY LIVING (IADL): ICD-10-CM

## 2023-03-20 DIAGNOSIS — G89.29 CHRONIC PAIN OF RIGHT HAND: ICD-10-CM

## 2023-03-20 DIAGNOSIS — M65.4 DE QUERVAIN'S TENOSYNOVITIS, RIGHT: Primary | ICD-10-CM

## 2023-03-20 DIAGNOSIS — M79.641 CHRONIC PAIN OF RIGHT HAND: ICD-10-CM

## 2023-03-20 PROCEDURE — 97010 HOT OR COLD PACKS THERAPY: CPT | Mod: PN

## 2023-03-20 PROCEDURE — 97035 APP MDLTY 1+ULTRASOUND EA 15: CPT | Mod: PN

## 2023-03-20 PROCEDURE — 97140 MANUAL THERAPY 1/> REGIONS: CPT | Mod: PN

## 2023-03-20 PROCEDURE — 97530 THERAPEUTIC ACTIVITIES: CPT | Mod: PN

## 2023-03-24 NOTE — PROGRESS NOTES
"  [facility]  Occupational Therapy Progress Note/Discharge Summary     Date: 3/27/2023  Name: Chitra Marie  Clinic Number: [id number]    Therapy Diagnosis:   Encounter Diagnoses   Name Primary?    De Quervain's tenosynovitis, right Yes    Alteration in instrumental activities of daily living (IADL)     Chronic pain of right hand      Physician: Russo-Digeorge, Jamie L*    Physician Orders: Eval and treat   Medical Diagnosis: M65.4 (ICD-10-CM) - De Quervain's tenosynovitis, right  Date of Injury/Onset: Insidious onset of R radial wrist pain for >6 months   Evaluation Date: 1/31/23  Insurance Authorization Period Expiration: 6/30/23  Plan of Care Expiration: 1/31/23-3/31/23; 6 visits on POC   Progress Note Due: 3/31/23   Date of Return to MD: TBD  Visit # / Visits authorized: 8/24 authorized visits  FOTO: initial evaluation, 5th visit, 9th visit        Precautions:  Standard  Orthopedic Precautions: N/A    Time In: 13:45  Time Out: 14:30  Total Billable Time: 45 minutes      SUBJECTIVE     Pt reports: "My wrist pain is more moderate today- and has been like that over the weekend. I don't think I was doing any more than I usually do. It's still this one little spot- it's so painful to the touch where that nodule is."     She was compliant with home exercise program given last session.     Response to previous treatment: N/A    Functional change: Increased ability to perform leisure activities with less pain     Pain: 4/10-6/10   Location: right dorsolateral wrist     OBJECTIVE     Objective Measures updated at progress report unless specified.    Special Tests:   Finkelstein's Test:  positive (5/10)   Grind Test: negative   ScaphoLunate Ballotment Test: negative     Edema: Circumferential measurements: as follows:   Location: RUE  Proximal Wrist Crease: 17 cm (+0.5 cm)    (L: 16 cm)      Range of Motion: right active  B digit and wrist ROM are WNL and symmetrical (B)  Note: Mild increase " in pain reported w/ R flexion & UD         Manual Muscle Test: deferred at this time  Muscle Strength        Strength: (RANDY Dynamometer in lbs), Average 3 trials, Position II  Right: 41.2 lbs (+4 lbs)   Left: 35.2 lbs     Pinch Strength: (Pinch Gauge in lbs), Average 3 trials  Lateral/Quinn Pinch       L) 10.3   R) 10.9 (same)   2pt Pinch                    L) 6.9     R) 7.5 (+1.5)     Note: Dorsolateral wrist pain reported w/ 2 pt pinches        Outcome Measure: FOTO  CMS Impairment/Limitation/Restriction for FOTO  Upper Extremity Survey     Therapist reviewed FOTO scores for Chitra Marie on 3/27/2023.   FOTO documents entered into [employer] - see Media section.     Limitation Score: 57% (6% improvement)   Category: Self Care            Treatment     Chitra received the following treatments listed below:    Re-Assessment performed x 15 minutes        Manual therapy techniques to increase joint mobilization and soft tissue mobilization for 15 minutes:   -Soft tissue massage (STM) and myofascial release (MFR) to R orearm/wrist/hand, Grade I/II joint mobs to wrist joint to increase joint mobility, ROM and for pain management.       Therapeutic activities (as part of HEP) to restore functional performance while increasing strength, endurance, ROM, and flexibility for 15 minutes, including:  - Review of education on joint protection and energy conservation methods     Patient Education and Home Exercises      Education provided:   - Progress towards goals     Written Home Exercises Provided: Continued w/ previous HEP.   Exercises were reviewed and Chitra was able to demonstrate them prior to the end of the session.  Chitra demonstrated good  understanding of the HEP provided. See EMR under Patient Instructions for exercises provided during previous therapy sessions.     ASSESSMENT     Pt has met 1 out of 3 LTG established for therapy, noting no improvement in dorsolateral wrist pain reported since initial  evaluation with partial compliance with bracing and resting from aggravating tasks reported concurrently. Pt will follow-up with referring provider to discuss additional treatment options.     Pt's spiritual, cultural and educational needs considered and Pt agreeable to Plan of Care and goals.    Anticipated barriers to occupational therapy: None noted       ShortTerm Goals (to be met in 3 weeks or by 2/21/23):   1. Patient to be IND and compliant with HEP & attendance for duration of therapy. In progress   2. Patient will report no more than 4/10 pain in R wrist/hand. Goal Met 2/27        Long Term Goals (to be met in 6 weeks or by DC):   1. Patient to be IND and compliant with HEP & attendance for duration of therapy. Goal Met   2. Patient will report increase in functional use of her R wrist/hand by 25% as evidenced by the FOTO outcome measure. Not Met   3. Patient will report no more than 2/10 pain in R wrist/hand. Not Met      PLAN     Patient is appropriate for discharge from Outpatient Occupational Therapy at this time and will follow-up with ortho specialist to discuss bone spur and lipoma removal.       YISEL Palacios

## 2023-03-27 ENCOUNTER — CLINICAL SUPPORT (OUTPATIENT)
Dept: REHABILITATION | Facility: HOSPITAL | Age: 68
End: 2023-03-27
Payer: MEDICARE

## 2023-03-27 DIAGNOSIS — Z78.9 ALTERATION IN INSTRUMENTAL ACTIVITIES OF DAILY LIVING (IADL): ICD-10-CM

## 2023-03-27 DIAGNOSIS — M79.641 CHRONIC PAIN OF RIGHT HAND: ICD-10-CM

## 2023-03-27 DIAGNOSIS — M65.4 DE QUERVAIN'S TENOSYNOVITIS, RIGHT: Primary | ICD-10-CM

## 2023-03-27 DIAGNOSIS — G89.29 CHRONIC PAIN OF RIGHT HAND: ICD-10-CM

## 2023-03-27 PROCEDURE — 97530 THERAPEUTIC ACTIVITIES: CPT | Mod: PN

## 2023-03-27 PROCEDURE — 97140 MANUAL THERAPY 1/> REGIONS: CPT | Mod: PN

## 2023-04-05 ENCOUNTER — TELEPHONE (OUTPATIENT)
Dept: ORTHOPEDICS | Facility: CLINIC | Age: 68
End: 2023-04-05
Payer: MEDICARE

## 2023-04-05 NOTE — TELEPHONE ENCOUNTER
Called and spoke with the pt in regard a f/u appt with Mauricio AMAYA. Informed that next availability and pt would like to confirm it. I scheduled her appt on 04/28/23 at 8.30 am at the [facility] on the 5 th floor and informed pt. Pt verbalized understanding and was thankful.

## 2023-04-05 NOTE — TELEPHONE ENCOUNTER
----- Message from Ky Purdy sent at 4/5/2023 10:40 AM CDT -----  Name of Who is Calling: GOLDY GIL [[id number]]           What is the request in detail: PT stated that she would like to discuss on going pain with the office and get an sooner appt before 5/29/2023.Please contact to further discuss and advise.            Can the clinic reply by [facility]: NO           [facility] Back [facility]: [phone]

## 2023-04-28 ENCOUNTER — OFFICE VISIT (OUTPATIENT)
Dept: ORTHOPEDICS | Facility: CLINIC | Age: 68
End: 2023-04-28
Payer: MEDICARE

## 2023-04-28 DIAGNOSIS — M65.4 DE QUERVAIN'S TENOSYNOVITIS, RIGHT: Primary | ICD-10-CM

## 2023-04-28 PROCEDURE — 1159F PR MEDICATION LIST DOCUMENTED IN MEDICAL RECORD: ICD-10-PCS | Mod: CPTII,S$GLB,, | Performed by: PHYSICIAN ASSISTANT

## 2023-04-28 PROCEDURE — 99999 PR PBB SHADOW E&M-EST. PATIENT-LVL II: ICD-10-PCS | Mod: PBBFAC,,, | Performed by: PHYSICIAN ASSISTANT

## 2023-04-28 PROCEDURE — 3288F FALL RISK ASSESSMENT DOCD: CPT | Mod: CPTII,S$GLB,, | Performed by: PHYSICIAN ASSISTANT

## 2023-04-28 PROCEDURE — 1101F PR PT FALLS ASSESS DOC 0-1 FALLS W/OUT INJ PAST YR: ICD-10-PCS | Mod: CPTII,S$GLB,, | Performed by: PHYSICIAN ASSISTANT

## 2023-04-28 PROCEDURE — 1125F AMNT PAIN NOTED PAIN PRSNT: CPT | Mod: CPTII,S$GLB,, | Performed by: PHYSICIAN ASSISTANT

## 2023-04-28 PROCEDURE — 99214 OFFICE O/P EST MOD 30 MIN: CPT | Mod: S$GLB,,, | Performed by: PHYSICIAN ASSISTANT

## 2023-04-28 PROCEDURE — 99214 PR OFFICE/OUTPT VISIT, EST, LEVL IV, 30-39 MIN: ICD-10-PCS | Mod: S$GLB,,, | Performed by: PHYSICIAN ASSISTANT

## 2023-04-28 PROCEDURE — 99999 PR PBB SHADOW E&M-EST. PATIENT-LVL II: CPT | Mod: PBBFAC,,, | Performed by: PHYSICIAN ASSISTANT

## 2023-04-28 PROCEDURE — 4010F ACE/ARB THERAPY RXD/TAKEN: CPT | Mod: CPTII,S$GLB,, | Performed by: PHYSICIAN ASSISTANT

## 2023-04-28 PROCEDURE — 1159F MED LIST DOCD IN RCRD: CPT | Mod: CPTII,S$GLB,, | Performed by: PHYSICIAN ASSISTANT

## 2023-04-28 PROCEDURE — 4010F PR ACE/ARB THEARPY RXD/TAKEN: ICD-10-PCS | Mod: CPTII,S$GLB,, | Performed by: PHYSICIAN ASSISTANT

## 2023-04-28 PROCEDURE — 1125F PR PAIN SEVERITY QUANTIFIED, PAIN PRESENT: ICD-10-PCS | Mod: CPTII,S$GLB,, | Performed by: PHYSICIAN ASSISTANT

## 2023-04-28 PROCEDURE — 1101F PT FALLS ASSESS-DOCD LE1/YR: CPT | Mod: CPTII,S$GLB,, | Performed by: PHYSICIAN ASSISTANT

## 2023-04-28 PROCEDURE — 3288F PR FALLS RISK ASSESSMENT DOCUMENTED: ICD-10-PCS | Mod: CPTII,S$GLB,, | Performed by: PHYSICIAN ASSISTANT

## 2023-04-28 RX ORDER — CEFAZOLIN SODIUM 2 G/50ML
2 SOLUTION INTRAVENOUS
Status: CANCELLED | OUTPATIENT
Start: 2023-04-28

## 2023-04-28 RX ORDER — MUPIROCIN 20 MG/G
OINTMENT TOPICAL
Status: CANCELLED | OUTPATIENT
Start: 2023-04-28

## 2023-04-28 NOTE — PROGRESS NOTES
[facility]  History & Physical  Orthopedics    SUBJECTIVE:      Chief Complaint: Right wrist pain    Referring Provider: No ref. provider found     Dr. Echeverria is the supervising physician for this encounter/patient    History of Present Illness:  Patient is a 68 y.o. right hand dominant female who presents today with complaints of right wrist pain present for 6+ months, no trauma/injury. Pain to radial wrist, worse with gripping/twisting, lifting or pushing through the wrist. 8/10 pain with activity. She did receive a steroid injection to the radial wrist about 6 months ago with outside provider. This was helpful. She has tried Voltaren gel. No bracing. No surgical history on the hands.     Interval History 4/28/23: the patient returns for continued treatment of her right wrist pain. Previously treated with DeQuervains steroid injection with outside provider which did help moderately with pain for short period of time. She has been doing OT for this since January 2023 with some improvement as well. However, pain is lingering and she is now ready to pursue surgery.    Onset of symptoms/DOI was 10+ months.    Symptoms are aggravated by activity and movement.    Symptoms are alleviated by rest.    Symptoms consist of pain.    The patient rates their pain as a 3/10    Attempted treatment(s) and/or interventions include activity modifications, rest, steroid injection, bracing, occupational therapy.     The patient denies any fevers, chills, N/V, D/C and presents for evaluation.       No past medical history on file.  Past Surgical History:   Procedure Laterality Date    HYSTERECTOMY      OOPHORECTOMY       Review of patient's allergies indicates:  No Known Allergies  Social History     Social History Narrative    Not on file     No family history on file.    No current outpatient medications on file.      Review of Systems:  Constitutional: no fever or chills  Eyes: no visual changes  ENT: no nasal  congestion or sore throat  Respiratory: no cough or shortness of breath  Cardiovascular: no chest pain  Gastrointestinal: no nausea or vomiting, tolerating diet  Musculoskeletal: pain and soreness    OBJECTIVE:      Vital Signs (Most Recent):  There were no vitals filed for this visit.  There is no height or weight on file to calculate BMI.      Physical Exam:  Constitutional: The patient appears well-developed and well-nourished. No distress.   Skin: No lesions appreciated  Head: Normocephalic and atraumatic.   Nose: Nose normal.   Ears: No deformities seen  Eyes: Conjunctivae and EOM are normal.   Neck: No tracheal deviation present.   Cardiovascular: Normal rate and intact distal pulses.    Pulmonary/Chest: Effort normal. No respiratory distress.   Abdominal: There is no guarding.   Neurological: The patient is alert.   Psychiatric: The patient has a normal mood and affect.     Right Hand/Wrist Examination:    Observation/Inspection:  Swelling  none    Deformity  none  Discoloration  none     Scars   none    Atrophy  none    HAND/WRIST EXAMINATION:  Finkelstein's Test   Neg today  WHAT Test    POS  Snuff box tenderness   Neg  Bautista's Test    Neg  Hook of Hamate Tenderness  Neg  CMC grind    Neg  Circumduction test   Neg  TTP to the radial styloid and first dorsal compartment.    Neurovascular Exam:  Digits WWP, brisk CR < 3s throughout  NVI motor/LTS to M/R/U nerves, radial pulse 2+  Tinel's Test - Carpal Tunnel  Neg  Tinel's Test - Cubital Tunnel  Neg  Phalen's Test    Neg  Median Nerve Compression Test Neg    ROM hand full, painless    ROM wrist full, painless    ROM elbow full, painless    Abdomen not guarded  Respirations nonlabored  Perfusion intact    Diagnostic Results:     Imaging - I independently viewed the patient's imaging as well as the radiology report.      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     Impression:     No fracture dislocation.  Additional findings above.     ASSESSMENT/PLAN:      68 y.o. yo female with Right wrist DeQuervains tendonitis. CMC arthritis on xrays, however appears asymptomatic today.    Plan: The patient and I had a thorough discussion today.  We discussed the working diagnosis as well as several other potential alternative diagnoses.  Treatment options were discussed, both conservative and surgical.  Conservative treatment options would include things such as activity modifications, workplace modifications, a period of rest, oral vs topical OTC and prescription anti-inflammatory medications, occupational therapy, splinting/bracing, immobilization, corticosteroid injections, and others.  Surgical options were discussed as well.     At this time, the patient would like to proceed with surgery. She is consented for Right DeQuervains release with Dr. Echeverria on 5/17/23. Case ordered for [place].    The patient has not responded to adequate non operative treatment at this time and/or non operative treatment is not indicated. Thus, the risks, benefits and alternatives to surgery were discussed with the patient in detail.  Specific risks include but are not limited to bleeding, infection, vessel and/or nerve damage, pain, numbness, tingling, complex regional pain syndrome, compartment syndrome, failure to return to pre-injury and/or preoperative functional status, scar sensitivity, delayed healing, inability to return to work, pulley injury, tendon injury, bowstringing, partial and/or incomplete relief of symptoms, weakness, persistence of and/or worsening of symptoms, surgical failure, osteomyelitis, amputation, loss of function, stiffness, functional debility, dysfunction, decreased  strength, need for prolonged postoperative rehabilitation, need for further surgery, deep venous thrombosis, pulmonary embolism, arthritis and death.  The patient states an  understanding and wishes to proceed with surgery.   All questions were answered.  No guarantees were implied or stated.  Written informed consent was obtained.    Should the patient's symptoms worsen, persist, or fail to improve they should return for reevaluation and I would be happy to see them back anytime.           Please do not hesitate to reach out to us via email, phone, or MyChart with any questions, concerns, or feedback.

## 2023-04-28 NOTE — H&P (VIEW-ONLY)
[facility]  History & Physical  Orthopedics    SUBJECTIVE:      Chief Complaint: Right wrist pain    Referring Provider: No ref. provider found     Dr. Echeverria is the supervising physician for this encounter/patient    History of Present Illness:  Patient is a 68 y.o. right hand dominant female who presents today with complaints of right wrist pain present for 6+ months, no trauma/injury. Pain to radial wrist, worse with gripping/twisting, lifting or pushing through the wrist. 8/10 pain with activity. She did receive a steroid injection to the radial wrist about 6 months ago with outside provider. This was helpful. She has tried Voltaren gel. No bracing. No surgical history on the hands.     Interval History 4/28/23: the patient returns for continued treatment of her right wrist pain. Previously treated with DeQuervains steroid injection with outside provider which did help moderately with pain for short period of time. She has been doing OT for this since January 2023 with some improvement as well. However, pain is lingering and she is now ready to pursue surgery.    Onset of symptoms/DOI was 10+ months.    Symptoms are aggravated by activity and movement.    Symptoms are alleviated by rest.    Symptoms consist of pain.    The patient rates their pain as a 3/10    Attempted treatment(s) and/or interventions include activity modifications, rest, steroid injection, bracing, occupational therapy.     The patient denies any fevers, chills, N/V, D/C and presents for evaluation.       No past medical history on file.  Past Surgical History:   Procedure Laterality Date    HYSTERECTOMY      OOPHORECTOMY       Review of patient's allergies indicates:  No Known Allergies  Social History     Social History Narrative    Not on file     No family history on file.    No current outpatient medications on file.      Review of Systems:  Constitutional: no fever or chills  Eyes: no visual changes  ENT: no nasal  congestion or sore throat  Respiratory: no cough or shortness of breath  Cardiovascular: no chest pain  Gastrointestinal: no nausea or vomiting, tolerating diet  Musculoskeletal: pain and soreness    OBJECTIVE:      Vital Signs (Most Recent):  There were no vitals filed for this visit.  There is no height or weight on file to calculate BMI.      Physical Exam:  Constitutional: The patient appears well-developed and well-nourished. No distress.   Skin: No lesions appreciated  Head: Normocephalic and atraumatic.   Nose: Nose normal.   Ears: No deformities seen  Eyes: Conjunctivae and EOM are normal.   Neck: No tracheal deviation present.   Cardiovascular: Normal rate and intact distal pulses.    Pulmonary/Chest: Effort normal. No respiratory distress.   Abdominal: There is no guarding.   Neurological: The patient is alert.   Psychiatric: The patient has a normal mood and affect.     Right Hand/Wrist Examination:    Observation/Inspection:  Swelling  none    Deformity  none  Discoloration  none     Scars   none    Atrophy  none    HAND/WRIST EXAMINATION:  Finkelstein's Test   Neg today  WHAT Test    POS  Snuff box tenderness   Neg  Bautista's Test    Neg  Hook of Hamate Tenderness  Neg  CMC grind    Neg  Circumduction test   Neg  TTP to the radial styloid and first dorsal compartment.    Neurovascular Exam:  Digits WWP, brisk CR < 3s throughout  NVI motor/LTS to M/R/U nerves, radial pulse 2+  Tinel's Test - Carpal Tunnel  Neg  Tinel's Test - Cubital Tunnel  Neg  Phalen's Test    Neg  Median Nerve Compression Test Neg    ROM hand full, painless    ROM wrist full, painless    ROM elbow full, painless    Abdomen not guarded  Respirations nonlabored  Perfusion intact    Diagnostic Results:     Imaging - I independently viewed the patient's imaging as well as the radiology report.      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     Impression:     No fracture dislocation.  Additional findings above.     ASSESSMENT/PLAN:      68 y.o. yo female with Right wrist DeQuervains tendonitis. CMC arthritis on xrays, however appears asymptomatic today.    Plan: The patient and I had a thorough discussion today.  We discussed the working diagnosis as well as several other potential alternative diagnoses.  Treatment options were discussed, both conservative and surgical.  Conservative treatment options would include things such as activity modifications, workplace modifications, a period of rest, oral vs topical OTC and prescription anti-inflammatory medications, occupational therapy, splinting/bracing, immobilization, corticosteroid injections, and others.  Surgical options were discussed as well.     At this time, the patient would like to proceed with surgery. She is consented for Right DeQuervains release with Dr. Echeverria on 5/17/23. Case ordered for [place].    The patient has not responded to adequate non operative treatment at this time and/or non operative treatment is not indicated. Thus, the risks, benefits and alternatives to surgery were discussed with the patient in detail.  Specific risks include but are not limited to bleeding, infection, vessel and/or nerve damage, pain, numbness, tingling, complex regional pain syndrome, compartment syndrome, failure to return to pre-injury and/or preoperative functional status, scar sensitivity, delayed healing, inability to return to work, pulley injury, tendon injury, bowstringing, partial and/or incomplete relief of symptoms, weakness, persistence of and/or worsening of symptoms, surgical failure, osteomyelitis, amputation, loss of function, stiffness, functional debility, dysfunction, decreased  strength, need for prolonged postoperative rehabilitation, need for further surgery, deep venous thrombosis, pulmonary embolism, arthritis and death.  The patient states an  understanding and wishes to proceed with surgery.   All questions were answered.  No guarantees were implied or stated.  Written informed consent was obtained.    Should the patient's symptoms worsen, persist, or fail to improve they should return for reevaluation and I would be happy to see them back anytime.           Please do not hesitate to reach out to us via email, phone, or MyChart with any questions, concerns, or feedback.

## 2023-05-01 ENCOUNTER — PATIENT MESSAGE (OUTPATIENT)
Dept: ADMINISTRATIVE | Facility: OTHER | Age: 68
End: 2023-05-01
Payer: MEDICARE

## 2023-05-08 ENCOUNTER — ANESTHESIA EVENT (OUTPATIENT)
Dept: SURGERY | Facility: HOSPITAL | Age: 68
End: 2023-05-08
Payer: MEDICARE

## 2023-05-16 ENCOUNTER — TELEPHONE (OUTPATIENT)
Dept: ORTHOPEDICS | Facility: CLINIC | Age: 68
End: 2023-05-16
Payer: MEDICARE

## 2023-05-16 RX ORDER — IBUPROFEN 600 MG/1
600 TABLET ORAL 3 TIMES DAILY
Qty: 20 TABLET | Refills: 0 | Status: SHIPPED | OUTPATIENT
Start: 2023-05-16

## 2023-05-16 RX ORDER — ACETAMINOPHEN 500 MG
1000 TABLET ORAL 2 TIMES DAILY
Qty: 20 TABLET | Refills: 0 | Status: SHIPPED | OUTPATIENT
Start: 2023-05-16

## 2023-05-16 RX ORDER — TRAMADOL HYDROCHLORIDE 50 MG/1
50 TABLET ORAL EVERY 6 HOURS
Qty: 20 TABLET | Refills: 0 | Status: SHIPPED | OUTPATIENT
Start: 2023-05-16

## 2023-05-16 NOTE — TELEPHONE ENCOUNTER
M for the patient. Notified of 9 AM arrival time to the [facility]. Requested confirmation.     Noemi Conroy MS, OTC  Clinical Assistant to Dr. Ross Dunbar [facility]

## 2023-05-16 NOTE — TELEPHONE ENCOUNTER
2nd VM left for pt regarding arrival time 9am    Noemi Conroy MS, OTC  Clinical & OR Assistant to Dr. Sagar Echeverria  [facility]  [phone]    ----- Message from Mor Do sent at 5/16/2023  5:44 PM CDT -----    ----- Message -----  From: Zaira Mott  Sent: 5/16/2023   3:21 PM CDT  To: Juwan Keith Staff    Pt calling for her time of arrival     Confirmed patient's contact info below:  Contact Name: Chitra Marie  Phone Number: [phone]

## 2023-05-17 ENCOUNTER — ANESTHESIA (OUTPATIENT)
Dept: SURGERY | Facility: HOSPITAL | Age: 68
End: 2023-05-17
Payer: MEDICARE

## 2023-05-17 ENCOUNTER — HOSPITAL ENCOUNTER (OUTPATIENT)
Facility: HOSPITAL | Age: 68
Discharge: HOME | End: 2023-05-17
Attending: ORTHOPAEDIC SURGERY | Admitting: ORTHOPAEDIC SURGERY
Payer: MEDICARE

## 2023-05-17 VITALS
BODY MASS INDEX: 33.75 KG/M2 | WEIGHT: 210 LBS | RESPIRATION RATE: 18 BRPM | HEIGHT: 66 IN | DIASTOLIC BLOOD PRESSURE: 53 MMHG | TEMPERATURE: 98 F | HEART RATE: 61 BPM | OXYGEN SATURATION: 97 % | SYSTOLIC BLOOD PRESSURE: 105 MMHG

## 2023-05-17 DIAGNOSIS — M65.4 DE QUERVAIN'S TENOSYNOVITIS, RIGHT: Primary | ICD-10-CM

## 2023-05-17 PROCEDURE — D9220A PRA ANESTHESIA: ICD-10-PCS | Mod: CRNA,,, | Performed by: NURSE ANESTHETIST, CERTIFIED REGISTERED

## 2023-05-17 PROCEDURE — 25000003 PHARM REV CODE 250: Performed by: PHYSICIAN ASSISTANT

## 2023-05-17 PROCEDURE — 71000033 HC RECOVERY, INTIAL HOUR: Performed by: ORTHOPAEDIC SURGERY

## 2023-05-17 PROCEDURE — 36000707: Performed by: ORTHOPAEDIC SURGERY

## 2023-05-17 PROCEDURE — 25000003 PHARM REV CODE 250: Performed by: NURSE ANESTHETIST, CERTIFIED REGISTERED

## 2023-05-17 PROCEDURE — 94761 N-INVAS EAR/PLS OXIMETRY MLT: CPT

## 2023-05-17 PROCEDURE — 37000009 HC ANESTHESIA EA ADD 15 MINS: Performed by: ORTHOPAEDIC SURGERY

## 2023-05-17 PROCEDURE — 99900035 HC TECH TIME PER 15 MIN (STAT)

## 2023-05-17 PROCEDURE — 88304 TISSUE EXAM BY PATHOLOGIST: CPT | Mod: 26,,, | Performed by: PATHOLOGY

## 2023-05-17 PROCEDURE — 36000706: Performed by: ORTHOPAEDIC SURGERY

## 2023-05-17 PROCEDURE — 25000 INCISION OF TENDON SHEATH: CPT | Mod: RT,,, | Performed by: ORTHOPAEDIC SURGERY

## 2023-05-17 PROCEDURE — 25000 PR INCIS TENDON SHEATH,RADIAL STYLOID: ICD-10-PCS | Mod: RT,,, | Performed by: ORTHOPAEDIC SURGERY

## 2023-05-17 PROCEDURE — 88304 PR  SURG PATH,LEVEL III: ICD-10-PCS | Mod: 26,,, | Performed by: PATHOLOGY

## 2023-05-17 PROCEDURE — 63600175 PHARM REV CODE 636 W HCPCS: Performed by: PHYSICIAN ASSISTANT

## 2023-05-17 PROCEDURE — D9220A PRA ANESTHESIA: Mod: CRNA,,, | Performed by: NURSE ANESTHETIST, CERTIFIED REGISTERED

## 2023-05-17 PROCEDURE — 25000003 PHARM REV CODE 250: Performed by: ORTHOPAEDIC SURGERY

## 2023-05-17 PROCEDURE — D9220A PRA ANESTHESIA: ICD-10-PCS | Mod: ANES,,, | Performed by: STUDENT IN AN ORGANIZED HEALTH CARE EDUCATION/TRAINING PROGRAM

## 2023-05-17 PROCEDURE — 25000003 PHARM REV CODE 250: Performed by: STUDENT IN AN ORGANIZED HEALTH CARE EDUCATION/TRAINING PROGRAM

## 2023-05-17 PROCEDURE — 37000008 HC ANESTHESIA 1ST 15 MINUTES: Performed by: ORTHOPAEDIC SURGERY

## 2023-05-17 PROCEDURE — 71000015 HC POSTOP RECOV 1ST HR: Performed by: ORTHOPAEDIC SURGERY

## 2023-05-17 PROCEDURE — D9220A PRA ANESTHESIA: Mod: ANES,,, | Performed by: STUDENT IN AN ORGANIZED HEALTH CARE EDUCATION/TRAINING PROGRAM

## 2023-05-17 PROCEDURE — 88304 TISSUE EXAM BY PATHOLOGIST: CPT | Performed by: PATHOLOGY

## 2023-05-17 PROCEDURE — 63600175 PHARM REV CODE 636 W HCPCS: Performed by: NURSE ANESTHETIST, CERTIFIED REGISTERED

## 2023-05-17 PROCEDURE — 27201423 OPTIME MED/SURG SUP & DEVICES STERILE SUPPLY: Performed by: ORTHOPAEDIC SURGERY

## 2023-05-17 RX ORDER — CELECOXIB 200 MG/1
400 CAPSULE ORAL ONCE
Status: COMPLETED | OUTPATIENT
Start: 2023-05-17 | End: 2023-05-17

## 2023-05-17 RX ORDER — OXYCODONE HYDROCHLORIDE 5 MG/1
5 TABLET ORAL
Status: DISCONTINUED | OUTPATIENT
Start: 2023-05-17 | End: 2023-05-17 | Stop reason: HOSPADM

## 2023-05-17 RX ORDER — TRAMADOL HYDROCHLORIDE 50 MG/1
50 TABLET ORAL DAILY PRN
COMMUNITY

## 2023-05-17 RX ORDER — RAMIPRIL 5 MG/1
5 CAPSULE ORAL DAILY
COMMUNITY
Start: 2023-04-22

## 2023-05-17 RX ORDER — ATORVASTATIN CALCIUM 20 MG/1
20 TABLET, FILM COATED ORAL DAILY
COMMUNITY

## 2023-05-17 RX ORDER — LIDOCAINE HYDROCHLORIDE 10 MG/ML
INJECTION, SOLUTION EPIDURAL; INFILTRATION; INTRACAUDAL; PERINEURAL
Status: DISCONTINUED | OUTPATIENT
Start: 2023-05-17 | End: 2023-05-17 | Stop reason: HOSPADM

## 2023-05-17 RX ORDER — PROPOFOL 10 MG/ML
VIAL (ML) INTRAVENOUS
Status: DISCONTINUED | OUTPATIENT
Start: 2023-05-17 | End: 2023-05-17

## 2023-05-17 RX ORDER — TRIAMTERENE AND HYDROCHLOROTHIAZIDE 37.5; 25 MG/1; MG/1
1 CAPSULE ORAL DAILY
COMMUNITY
Start: 2023-04-17

## 2023-05-17 RX ORDER — HYDROMORPHONE HYDROCHLORIDE 1 MG/ML
0.2 INJECTION, SOLUTION INTRAMUSCULAR; INTRAVENOUS; SUBCUTANEOUS EVERY 5 MIN PRN
Status: DISCONTINUED | OUTPATIENT
Start: 2023-05-17 | End: 2023-05-17 | Stop reason: HOSPADM

## 2023-05-17 RX ORDER — ATORVASTATIN CALCIUM 20 MG/1
20 TABLET, FILM COATED ORAL DAILY
COMMUNITY
Start: 2022-12-10

## 2023-05-17 RX ORDER — FENTANYL CITRATE 50 UG/ML
25 INJECTION, SOLUTION INTRAMUSCULAR; INTRAVENOUS EVERY 5 MIN PRN
Status: DISCONTINUED | OUTPATIENT
Start: 2023-05-17 | End: 2023-05-17 | Stop reason: HOSPADM

## 2023-05-17 RX ORDER — MIDAZOLAM HYDROCHLORIDE 1 MG/ML
INJECTION, SOLUTION INTRAMUSCULAR; INTRAVENOUS
Status: DISCONTINUED | OUTPATIENT
Start: 2023-05-17 | End: 2023-05-17

## 2023-05-17 RX ORDER — ACETAMINOPHEN 500 MG
1000 TABLET ORAL
Status: COMPLETED | OUTPATIENT
Start: 2023-05-17 | End: 2023-05-17

## 2023-05-17 RX ORDER — ONDANSETRON 2 MG/ML
INJECTION INTRAMUSCULAR; INTRAVENOUS
Status: DISCONTINUED | OUTPATIENT
Start: 2023-05-17 | End: 2023-05-17

## 2023-05-17 RX ORDER — LIDOCAINE HYDROCHLORIDE 20 MG/ML
INJECTION INTRAVENOUS
Status: DISCONTINUED | OUTPATIENT
Start: 2023-05-17 | End: 2023-05-17

## 2023-05-17 RX ORDER — HALOPERIDOL 5 MG/ML
0.5 INJECTION INTRAMUSCULAR EVERY 10 MIN PRN
Status: DISCONTINUED | OUTPATIENT
Start: 2023-05-17 | End: 2023-05-17 | Stop reason: HOSPADM

## 2023-05-17 RX ORDER — FENTANYL CITRATE 50 UG/ML
INJECTION, SOLUTION INTRAMUSCULAR; INTRAVENOUS
Status: DISCONTINUED | OUTPATIENT
Start: 2023-05-17 | End: 2023-05-17

## 2023-05-17 RX ORDER — PROPOFOL 10 MG/ML
VIAL (ML) INTRAVENOUS CONTINUOUS PRN
Status: DISCONTINUED | OUTPATIENT
Start: 2023-05-17 | End: 2023-05-17

## 2023-05-17 RX ORDER — CITALOPRAM 40 MG/1
40 TABLET, FILM COATED ORAL DAILY
COMMUNITY
Start: 2023-04-22

## 2023-05-17 RX ORDER — ONDANSETRON 2 MG/ML
4 INJECTION INTRAMUSCULAR; INTRAVENOUS DAILY PRN
Status: DISCONTINUED | OUTPATIENT
Start: 2023-05-17 | End: 2023-05-17 | Stop reason: HOSPADM

## 2023-05-17 RX ORDER — ACETAMINOPHEN 500 MG
1000 TABLET ORAL 3 TIMES DAILY
COMMUNITY

## 2023-05-17 RX ORDER — MUPIROCIN 20 MG/G
OINTMENT TOPICAL
Status: DISCONTINUED | OUTPATIENT
Start: 2023-05-17 | End: 2023-05-17 | Stop reason: HOSPADM

## 2023-05-17 RX ADMIN — CELECOXIB 400 MG: 200 CAPSULE ORAL at 10:05

## 2023-05-17 RX ADMIN — PROPOFOL 100 MCG/KG/MIN: 10 INJECTION, EMULSION INTRAVENOUS at 11:05

## 2023-05-17 RX ADMIN — MUPIROCIN: 20 OINTMENT TOPICAL at 10:05

## 2023-05-17 RX ADMIN — CEFAZOLIN 2 G: 2 INJECTION, POWDER, FOR SOLUTION INTRAMUSCULAR; INTRAVENOUS at 11:05

## 2023-05-17 RX ADMIN — SODIUM CHLORIDE: 9 INJECTION, SOLUTION INTRAVENOUS at 11:05

## 2023-05-17 RX ADMIN — FENTANYL CITRATE 25 MCG: 50 INJECTION, SOLUTION INTRAMUSCULAR; INTRAVENOUS at 11:05

## 2023-05-17 RX ADMIN — SODIUM CHLORIDE: 9 INJECTION, SOLUTION INTRAVENOUS at 10:05

## 2023-05-17 RX ADMIN — ONDANSETRON 4 MG: 2 INJECTION, SOLUTION INTRAMUSCULAR; INTRAVENOUS at 11:05

## 2023-05-17 RX ADMIN — PROPOFOL 20 MG: 10 INJECTION, EMULSION INTRAVENOUS at 11:05

## 2023-05-17 RX ADMIN — ACETAMINOPHEN 1000 MG: 500 TABLET ORAL at 10:05

## 2023-05-17 RX ADMIN — LIDOCAINE HYDROCHLORIDE 60 MG: 20 INJECTION INTRAVENOUS at 11:05

## 2023-05-17 RX ADMIN — MIDAZOLAM HYDROCHLORIDE 1 MG: 1 INJECTION, SOLUTION INTRAMUSCULAR; INTRAVENOUS at 11:05

## 2023-05-17 NOTE — INTERVAL H&P NOTE
The patient has been examined and the H&P has been reviewed:    I concur with the findings and no changes have occurred since H&P was written.    Surgery risks, benefits and alternative options discussed and understood by patient/family.      The patient has not responded to adequate non operative treatment at this time and/or non operative treatment is not indicated. Thus, the risks, benefits and alternatives to surgery were discussed with the patient in detail.  Specific risks include but are not limited to bleeding, infection, vessel and/or nerve damage, pain, numbness, tingling, complex regional pain syndrome, compartment syndrome, failure to return to pre-injury and/or preoperative functional status, scar sensitivity, delayed healing, inability to return to work, pulley injury, tendon injury, bowstringing, partial and/or incomplete relief of symptoms, weakness, persistence of and/or worsening of symptoms, surgical failure, osteomyelitis, amputation, loss of function, stiffness, functional debility, dysfunction, decreased  strength, need for prolonged postoperative rehabilitation, need for further surgery, deep venous thrombosis, pulmonary embolism, arthritis and death.  The patient states an understanding and wishes to proceed with surgery.   All questions were answered.  No guarantees were implied or stated.  Written informed consent was obtained.      There are no hospital problems to display for this patient.

## 2023-05-17 NOTE — DISCHARGE INSTRUCTIONS
AFTER HAND SURGERY    DOS:  Keep affected wrist elevated above the level of your heart  Check circulation frequently in fingers by pressing on the nail bed. Nail bed should turn white and then pink when released.  Exercise fingers to promote circulation.  Advance diet as tolerated  Resume home medications today.      DONT:  No driving for 24 hours or while taking narcotic pain medication      CALL PHYSICIAN FOR:  Obvious bleeding  Excessive swelling  Drainage (pus) from the wound  Pain unrelieved by pain medication.

## 2023-05-17 NOTE — ANESTHESIA PREPROCEDURE EVALUATION
2023  Pre-operative evaluation for Procedure(s) (LRB):  RELEASE, HAND, FOR DEQUERVAIN'S TENOSYNOVITIS - RIGHT (Right)    Chitra Marie is a 68 y.o. female     Patient Active Problem List   Diagnosis    De Quervain's tenosynovitis, right    Alteration in instrumental activities of daily living (IADL)    Chronic hand pain       Review of patient's allergies indicates:  No Known Allergies    No current facility-administered medications on file prior to encounter.     Current Outpatient Medications on File Prior to Encounter   Medication Sig Dispense Refill    acetaminophen (TYLENOL) 500 MG tablet Take 1,000 mg by mouth 3 (three) times daily.      atorvastatin (LIPITOR) 20 MG tablet Take 20 mg by mouth Daily.      citalopram (CELEXA) 40 MG tablet Take 40 mg by mouth once daily.      ramipriL (ALTACE) 5 MG capsule Take 5 mg by mouth once daily.      traMADoL (ULTRAM) 50 mg tablet Take 50 mg by mouth daily as needed for Pain.      triamterene-hydrochlorothiazide 37.5-25 mg (DYAZIDE) 37.5-25 mg per capsule Take 1 capsule by mouth once daily.      atorvastatin (LIPITOR) 20 MG tablet Take 20 mg by mouth once daily.         Past Surgical History:   Procedure Laterality Date    CHOLECYSTECTOMY N/A     HYSTERECTOMY      OOPHORECTOMY      TOTAL KNEE ARTHROPLASTY Right 2019       Social History     Socioeconomic History    Marital status:    Tobacco Use    Smoking status: Former     Packs/day: 1.00     Years: 5.00     Pack years: 5.00     Types: Cigarettes     Start date:      Quit date:      Years since quittin.4    Smokeless tobacco: Never   Substance and Sexual Activity    Alcohol use: Not Currently    Drug use: Never    Sexual activity: Not Currently         CBC: No results for input(s): WBC, RBC, HGB, HCT, PLT, MCV, MCH, MCHC in the last 72 hours.    CMP: No  results for input(s): NA, K, CL, CO2, BUN, CREATININE, GLU, MG, PHOS, CALCIUM, ALBUMIN, PROT, ALKPHOS, ALT, AST, BILITOT in the last 72 hours.    INR  No results for input(s): PT, INR, PROTIME, APTT in the last 72 hours.        Diagnostic Studies:      EKD Echo:  No results found for this or any previous visit.    Pre-op Assessment    I have reviewed the Patient Summary Reports.     I have reviewed the Nursing Notes. I have reviewed the NPO Status.   I have reviewed the Medications.     Review of Systems  Cardiovascular:   Hypertension    Psych:   Psychiatric History          Physical Exam  General: Well nourished and Cooperative    Airway:  Mallampati: II   Mouth Opening: Normal  TM Distance: Normal  Tongue: Normal  Neck ROM: Normal ROM    Chest/Lungs:  Clear to auscultation, Normal Respiratory Rate    Heart:  Rate: Normal  Rhythm: Regular Rhythm  Sounds: Normal        Anesthesia Plan  Type of Anesthesia, risks & benefits discussed:    Anesthesia Type: Gen Natural Airway  Intra-op Monitoring Plan: Standard ASA Monitors  Post Op Pain Control Plan: multimodal analgesia and IV/PO Opioids PRN  Induction:  IV  Airway Plan: Direct and Video, Post-Induction  Informed Consent: Informed consent signed with the Patient and all parties understand the risks and agree with anesthesia plan.  All questions answered.   ASA Score: 2    Ready For Surgery From Anesthesia Perspective.     .

## 2023-05-17 NOTE — DISCHARGE SUMMARY
[facility] - Surgery (Hospital)  Discharge Note  Short Stay    Procedure(s) (LRB):  RELEASE, HAND, FOR DEQUERVAIN'S TENOSYNOVITIS - RIGHT (Right)      OUTCOME: Patient tolerated treatment/procedure well without complication and is now ready for discharge.    DISPOSITION: Home or Self Care    FINAL DIAGNOSIS:  <principal problem not specified>    FOLLOWUP: In clinic    DISCHARGE INSTRUCTIONS:    Discharge Procedure Orders   Notify your health care provider if you experience any of the following:  increased confusion or weakness     Notify your health care provider if you experience any of the following:  persistent dizziness, light-headedness, or visual disturbances     Notify your health care provider if you experience any of the following:  worsening rash     Notify your health care provider if you experience any of the following:  severe persistent headache     Notify your health care provider if you experience any of the following:  difficulty breathing or increased cough     Notify your health care provider if you experience any of the following:  redness, tenderness, or signs of infection (pain, swelling, redness, odor or green/yellow discharge around incision site)     Notify your health care provider if you experience any of the following:  severe uncontrolled pain     Notify your health care provider if you experience any of the following:  persistent nausea and vomiting or diarrhea     Notify your health care provider if you experience any of the following:  temperature >100.4        TIME SPENT ON DISCHARGE: 5 minutes

## 2023-05-17 NOTE — PLAN OF CARE
Pre op complete. Questions answered. Resting comfortably. Call light in reach. Personal belongings placed in locker. Brother needs 10 minute phone call for ride home.

## 2023-05-17 NOTE — OP NOTE
ORTHOPEDIC SURGERY OPERATIVE NOTE     SERVICE: ORTHOPEDICS      DATE OF PROCEDURE:05/17/2023     SURGEON: Sagar Echeverria M.D.     ASSISTANT: MD Mesfin     PREOPERATIVE DIAGNOSIS:   1)Right deQuervains tenosynovitis     POSTOPERATIVE DIAGNOSIS:  1) Right deQuervains tenosynovitis     PROCEDURE PERFORMED:  1) Right first dorsal compartment release     ANESTHESIA: Local/MAC     ESTIMATED BLOOD LOSS: Minimal     SPECIMENS:  A small retinacular cyst was encountered overlying the 1st dorsal compartment sheath and this was sent for pathology     COMPLICATIONS: None           CONDITION: Stable     IV FLUIDS: Crystalloid per anesthesia     IMPLANTS: None     TOURNIQUET TIME: 12 minutes at 250 mmHg     INDICATIONS FOR PROCEDURE: The patient is a 68 y.o. female who presented to clinic with findings and workup consistent with dequervains tenosynovitis of the Right wrist.  The patient had not responded to nonsurgical treatments and desired surgical intervention which was deemed appropriate.  Thus, the risks, benefits and alternatives to surgery were discussed with the patient in detail.  Specific risks include but are not limited to bleeding, infection, vessel and/or nerve damage, pain, numbness, tingling, complex regional pain syndrome, compartment syndrome, failure to return to pre-injury and/or preoperative functional status, scar sensitivity, delayed healing, inability to return to work, pulley injury, tendon injury, bowstringing, partial and/or incomplete relief of symptoms, weakness, persistence of and/or worsening of symptoms, surgical failure, osteomyelitis, amputation, loss of function, stiffness, functional debility, dysfunction, decreased  strength, need for prolonged postoperative rehabilitation, need for further surgery, deep venous thrombosis, pulmonary embolism, arthritis and death.  The patient states an understanding and wishes to proceed with surgery.   All questions were answered.  No guarantees were  implied or stated.  Written informed consent was obtained.       PROCEDURE IN DETAIL: With the patient's participation in the preoperative holding area, the Right upper extremity was marked as the surgical site. Preoperative checks were completed and consents were verified.  The patient was brought to the Operating Room and placed in supine position.  A well-padded nonsterile tourniquet was placed on the forearm.  The operative arm was then prepped and draped in the usual sterile fashion.  Timeout was called for to verify the correct site, procedure and patient.  All were in agreement and we proceeded.  MAC anesthesia was introduced.  Under sterile conditions, an injection of 1% plain lidocaine was injected into the skin and subcutaneous tissues at the first dorsal compartment.  The arm was exsanguinated using an Esmarch and the tourniquet was inflated to 250mmHg. I turned my attention toward the dequervains first dorsal compartment release.  Skin incision was made sharply with scalpel for a length of 2cm and dissection carried down through subcutaneous tissues.  The radial sensory nerve and several of its branches were identified and retracted.  Dissection carried more deeply to the level of the first dorsal compartment.  A small retinacular appearing cyst was encountered overlying the 1st dorsal compartment and this was excised and sent for specimen.  I then once again turned my attention towards the 1st dorsal compartment sheath.  This was incised and complete division was taken proximally and distally to ensure complete release.  Division was made dorsally in the retinaculum to decrease the risk of tendon subluxation.  Complete division was undertaken both proximally and distally.  Once complete release was performed of both the APL as well as the EPB the wound was irrigated copiously with sterile saline. Skin was closed with 4-0 Vicryl, 4-0 Nylon.  Sterile dressing was applied consisting of Xeroform 4 x 4 gauze  and a thumb spica splint.  Tourniquet was then deflated at which point brisk capillary refill ensued throughout the operative extremity, specifically including the thumb.  There was no significant bleeding.  The patient was transferred to the recovery room in stable condition.      DISPOSITION: The patient will be discharged home with followup in 22 weeks for suture removal.  They are to keep the dressing clean, dry, and intact until that time.  Range of motion of the non operative digits was discussed and encouraged as tolerated without restrictions.

## 2023-05-17 NOTE — ANESTHESIA POSTPROCEDURE EVALUATION
Anesthesia Post Evaluation    Patient: Chitra Marie    Procedure(s) Performed: Procedure(s) (LRB):  RELEASE, HAND, FOR DEQUERVAIN'S TENOSYNOVITIS - RIGHT (Right)    Final Anesthesia Type: general      Patient location during evaluation: PACU  Patient participation: Yes- Able to Participate  Level of consciousness: awake and alert  Post-procedure vital signs: reviewed and stable  Pain management: adequate  Airway patency: patent  SHER mitigation strategies: Multimodal analgesia  PONV status at discharge: No PONV  Anesthetic complications: no      Cardiovascular status: blood pressure returned to baseline and hemodynamically stable  Respiratory status: unassisted  Hydration status: euvolemic  Follow-up not needed.          Vitals Value Taken Time   /53 05/17/23 1216   Temp 36.6 °C (97.9 °F) 05/17/23 1200   Pulse 60 05/17/23 1222   Resp 20 05/17/23 1221   SpO2 99 % 05/17/23 1222   Vitals shown include unvalidated device data.      Event Time   Out of Recovery 12:15:00         Pain/Linn Score: Pain Rating Prior to Med Admin: 0 (5/17/2023 10:07 AM)  Linn Score: 10 (5/17/2023 12:00 PM)

## 2023-05-17 NOTE — PLAN OF CARE
Patient is AAO and VSS. Tolerating PO and states pain is tolerable. Dressing CDI. Patient states they are ready for d/c. IV removed.  Catheter tip intact. Brother at bedside. Discharge instructions reviewed and copy given to the patient and brother. Questions answered.  Both verbalized understanding. Medication delivered to bedside. Patient wheeled to car by Bev WANG

## 2023-05-18 ENCOUNTER — PATIENT MESSAGE (OUTPATIENT)
Dept: ORTHOPEDICS | Facility: CLINIC | Age: 68
End: 2023-05-18
Payer: MEDICARE

## 2023-05-23 LAB
FINAL PATHOLOGIC DIAGNOSIS: NORMAL
Lab: NORMAL

## 2023-05-30 ENCOUNTER — OFFICE VISIT (OUTPATIENT)
Dept: ORTHOPEDICS | Facility: CLINIC | Age: 68
End: 2023-05-30
Payer: MEDICARE

## 2023-05-30 DIAGNOSIS — M65.4 DE QUERVAIN'S TENOSYNOVITIS, RIGHT: Primary | ICD-10-CM

## 2023-05-30 DIAGNOSIS — Z98.890 POSTOPERATIVE STATE: ICD-10-CM

## 2023-05-30 PROCEDURE — 1159F MED LIST DOCD IN RCRD: CPT | Mod: CPTII,S$GLB,, | Performed by: PHYSICIAN ASSISTANT

## 2023-05-30 PROCEDURE — 3288F PR FALLS RISK ASSESSMENT DOCUMENTED: ICD-10-PCS | Mod: CPTII,S$GLB,, | Performed by: PHYSICIAN ASSISTANT

## 2023-05-30 PROCEDURE — 4010F ACE/ARB THERAPY RXD/TAKEN: CPT | Mod: CPTII,S$GLB,, | Performed by: PHYSICIAN ASSISTANT

## 2023-05-30 PROCEDURE — 1101F PR PT FALLS ASSESS DOC 0-1 FALLS W/OUT INJ PAST YR: ICD-10-PCS | Mod: CPTII,S$GLB,, | Performed by: PHYSICIAN ASSISTANT

## 2023-05-30 PROCEDURE — 1101F PT FALLS ASSESS-DOCD LE1/YR: CPT | Mod: CPTII,S$GLB,, | Performed by: PHYSICIAN ASSISTANT

## 2023-05-30 PROCEDURE — 3288F FALL RISK ASSESSMENT DOCD: CPT | Mod: CPTII,S$GLB,, | Performed by: PHYSICIAN ASSISTANT

## 2023-05-30 PROCEDURE — 99999 PR PBB SHADOW E&M-EST. PATIENT-LVL III: ICD-10-PCS | Mod: PBBFAC,,, | Performed by: PHYSICIAN ASSISTANT

## 2023-05-30 PROCEDURE — 99999 PR PBB SHADOW E&M-EST. PATIENT-LVL III: CPT | Mod: PBBFAC,,, | Performed by: PHYSICIAN ASSISTANT

## 2023-05-30 PROCEDURE — 99024 PR POST-OP FOLLOW-UP VISIT: ICD-10-PCS | Mod: S$GLB,,, | Performed by: PHYSICIAN ASSISTANT

## 2023-05-30 PROCEDURE — 99024 POSTOP FOLLOW-UP VISIT: CPT | Mod: S$GLB,,, | Performed by: PHYSICIAN ASSISTANT

## 2023-05-30 PROCEDURE — 1159F PR MEDICATION LIST DOCUMENTED IN MEDICAL RECORD: ICD-10-PCS | Mod: CPTII,S$GLB,, | Performed by: PHYSICIAN ASSISTANT

## 2023-05-30 PROCEDURE — 1126F PR PAIN SEVERITY QUANTIFIED, NO PAIN PRESENT: ICD-10-PCS | Mod: CPTII,S$GLB,, | Performed by: PHYSICIAN ASSISTANT

## 2023-05-30 PROCEDURE — 4010F PR ACE/ARB THEARPY RXD/TAKEN: ICD-10-PCS | Mod: CPTII,S$GLB,, | Performed by: PHYSICIAN ASSISTANT

## 2023-05-30 PROCEDURE — 1126F AMNT PAIN NOTED NONE PRSNT: CPT | Mod: CPTII,S$GLB,, | Performed by: PHYSICIAN ASSISTANT

## 2023-05-30 NOTE — PROCEDURES
Right radial wrist nerve block    Date/Time: 5/30/2023 11:15 AM  Performed by: Jamie L. Russo-Digeorge, PA-C  Authorized by: Jamie L. Russo-Digeorge, PA-C     Consent Done?:  Yes (Verbal)  Indications:  Pain  Site marked: the procedure site was marked    Timeout: prior to procedure the correct patient, procedure, and site was verified    Prep: patient was prepped and draped in usual sterile fashion      Local anesthesia used?: Yes    Anesthesia:  Local infiltration (Topical cold spray used prior to the injection and 1cc 1% plain lidocaine contained in the injectate)  Local anesthetic:  Lidocaine 1% without epinephrine  Location:  Wrist  Site:  R first doral compartment  Ultrasonic guidance for needle placement?: No    Needle size:  25 G  Approach:  Radial  Patient tolerance:  Patient tolerated the procedure well with no immediate complications

## 2023-05-30 NOTE — PROGRESS NOTES
Dr. Echeverria is the supervising physician for this encounter/patient    Chitra SMOOTH Marie presents for post-operative evaluation.  The patient is now 2 weeks s/p Right first dorsal compartment release with Dr. Echeverria on 5/17/23.  Overall the patient reports doing well.  She reports 0/10 pain, denies any f/c/s, no numbness. She is not taking any medications for this. She is not scheduled with OT but would like to work with them.    PE:    AA&O x 4.  NAD  HEENT:  NCAT, sclera nonicteric  Lungs:  Respirations are equal and unlabored.  CV:  2+ bilateral upper and lower extremity pulses.  MSK: The wound is healing well with no signs of erythema or warmth.  There is no drainage.  No clinical signs or symptoms of infection are present.  Thumb motion intact, SILT. DNVI.    A/P: Status post above, doing well  1) OT for postop rehab ordered. Brace just for comfort, activity as tolerated.  2) she experienced a vasovagal response to suture removal. Lidocaine injection performed to surgery site and suture removal was performed. Wound care and signs of infection discussed.  3) F/U 4 weeks  4) Call with any questions/concerns in the interim      Jamie Russo-DiGeorge PA-C

## 2023-06-01 NOTE — PROGRESS NOTES
"  [facility]  Occupational Therapy Daily Treatment Note    Date: 2/3/2023  Name: Chitra Marie  Clinic Number: [id number]    Therapy Diagnosis:   Encounter Diagnoses   Name Primary?    De Quervain's tenosynovitis, right Yes    Alteration in instrumental activities of daily living (IADL)     Chronic pain of right hand      Physician: Russo-Digeorge, Jamie L*    Physician Orders: Eval and treat   Medical Diagnosis: M65.4 (ICD-10-CM) - De Quervain's tenosynovitis, right  Date of Injury/Onset: Insidious onset of R radial wrist pain for >6 months   Evaluation Date: 1/31/23  Insurance Authorization Period Expiration: 2/28/23  Plan of Care Expiration: 1/31/23-3/31/23  Progress Note Due: 2/28/23   Date of Return to MD: TBD  Visit # / Visits authorized: 1/12 authorized visits / 6 visits on POC   FOTO: initial evaluation     Precautions:  Standard  Orthopedic Precautions: N/A    Time In: 11:34  Time Out: 12:16  Total Billable Time: 42 minutes      SUBJECTIVE     Pt reports: "I still need to get the wrist brace- I've been looking everywhere."     She was compliant with home exercise program given last session. N/A     Response to previous treatment: N/A    Functional change: N/A    Pain: 1/10  Location: right dorsolateral wrist     OBJECTIVE   Objective Measures updated at progress report unless specified.    Edema: Circumferential measurements: as follows:   Location: RUE  Proximal Wrist Crease: 16.5 cm  (L: 16 cm)      Range of Motion: right active  B digit and wrist ROM are WNL and symmetrical (B)  Note: 2/10-3/10 pain reported w/ R wrist flexion, extension, UD, and thumb palmar ABD        Manual Muscle Test: deferred at this time  Muscle Strength      Strength: (RANDY Dynamometer in lbs), Average 3 trials, Position II  Right: 37.2 lbs  Left: 35.2 lbs     Pinch Strength: (Pinch Gauge in lbs), Average 3 trials  Lateral/Quinn Pinch       L) 10.3   R) 10.9  2pt Pinch                    L) 6.9   "   R) 6      Treatment     Chitra received the following treatments listed below:     Supervised modalities after being cleared for contradictions for 8 minutes:   -Paraffin bath to R hand/wrist for 8 minutes to increase blood flow, circulation, pain management and for tissue elasticity prior to therex.       Direct contact modalities after being cleared for contraindications for 8 minutes:  -Utrasound for scar tissue remodeling, increased circulation, & tissue elasticity @ 100% duty cycle, 3.3 Mhz, applied to R dorsolateral wrist/forearm, intensity = 0.6 w/cm2       Manual therapy techniques to increase joint mobilization and soft tissue mobilization for 18 minutes:   -Soft tissue massage (STM) and myofascial release (MFR) to R orearm/wrist/hand, Grade I/II joint mobs to wrist joint for 18 minutes to increase joint mobility, ROM and for pain management.       Therapeutic activities/exercises to restore functional performance while increasing strength, endurance, ROM, and flexibility for 8 minutes, including:  --Kinesiotape applied to R hand/wrist/forearm in order to decrease dorsolateral wrist pain; 2 I cuts; 50%-75% tension; anchored at dorsal thumb P2 and lateral elbow/proximal forearm. Pt educated that tape may be left on for five days, can shower with tape. Remove with cold water and/or lotion/oil.  - Review of home program     Patient Education and Home Exercises      Education provided:   - Importance of compliance w/ HEP to maximize functional gains   - Progress towards goals     Written Home Exercises Provided: yes.  Exercises were reviewed and Chitra was able to demonstrate them prior to the end of the session.  Chitra demonstrated good  understanding of the HEP provided. See EMR under Patient Instructions for exercises provided during previous therapy sessions:     ASSESSMENT     Pt tolerated session well, noting decreased pain reported since initial evaluation, reporting that she has actively  been trying to avoid using her R hand as much when lifting heavy objects.     Chitra is progressing well towards her goals and there are no updates to goals at this time. Pt prognosis is Good.     Pt will continue to benefit from skilled Outpatient Occupational Therapy to address the deficits listed in the problem list on initial evaluation, provide Pt/family education and to maximize Pt's level of independence in the home and community environment.     Pt's spiritual, cultural and educational needs considered and Pt agreeable to Plan of Care and goals.    Anticipated barriers to occupational therapy: None noted       ShortTerm Goals (to be met in 3 weeks or by 2/21/23):   1. Patient to be IND and compliant with HEP & attendance for duration of therapy.  2. Patient will report no more than 4/10 pain in R wrist/hand.         Long Term Goals (to be met in 6 weeks or by DC):   1. Patient to be IND and compliant with HEP & attendance for duration of therapy.  2. Patient will report increase in functional use of her R wrist/hand by 25% as evidenced by the FOTO outcome measure.   3. Patient will report no more than 2/10 pain in R wrist/hand.            PLAN     Continue Skilled Occupational Therapy with individualized Plan of Care to address the above outlined treatment goals for 1 time per week for 6 weeks, or 6 visits, during the certification period from 1/31/23 to 3/31/23.       Updates/Grading for next session: TBD pending progress     YISEL Palacios                Vacuum Pressure High Setting (Will Not Render If Set To 0): 0

## 2023-06-05 ENCOUNTER — CLINICAL SUPPORT (OUTPATIENT)
Dept: REHABILITATION | Facility: HOSPITAL | Age: 68
End: 2023-06-05
Attending: PHYSICIAN ASSISTANT
Payer: MEDICARE

## 2023-06-05 DIAGNOSIS — R60.0 LOCALIZED EDEMA: ICD-10-CM

## 2023-06-05 DIAGNOSIS — Z98.890 POSTOPERATIVE STATE: ICD-10-CM

## 2023-06-05 DIAGNOSIS — M65.4 DE QUERVAIN'S TENOSYNOVITIS, RIGHT: Primary | ICD-10-CM

## 2023-06-05 DIAGNOSIS — Z78.9 ALTERATION IN INSTRUMENTAL ACTIVITIES OF DAILY LIVING (IADL): ICD-10-CM

## 2023-06-05 DIAGNOSIS — L90.5 SCAR: ICD-10-CM

## 2023-06-05 PROCEDURE — 97166 OT EVAL MOD COMPLEX 45 MIN: CPT | Mod: PN

## 2023-06-05 NOTE — PLAN OF CARE
"  [facility]  Occupational Therapy Initial Evaluation     Name: Chitra Marie  Clinic Number: [id number]    Therapy Diagnosis:   Encounter Diagnoses   Name Primary?    De Quervain's tenosynovitis, right Yes    Postoperative state      Physician: Sagar Echeverria MD/Russo-Digeorge, Jamie L*    Physician Orders: Eval and Treat  Medical Diagnosis: M65.4 (ICD-10-CM) - De Quervain's tenosynovitis, right Z98.890 (ICD-10-CM) - Postoperative state  Surgical Procedure and Date: 5/17/23, R first dorsal compartment release  Evaluation Date: 6/5/2023  Insurance Authorization Period Expiration: TBD  Plan of Care Certification Period: 6/5/23-7/3/23  Date of Return to MD: 6/27/23  Visit # / Visits authorized: 1/1  FOTO: Initial evaluation 47%    Precautions:  Standard; Pt is currently 2 weeks/5 days post-op     Time In:15:15  Time Out: 16:00  Total Appointment Time (timed & untimed codes): 45 minutes    Subjective     Date of Onset: Pt has been experiencing R dorsolateral wrist pain since approx. August 2022    History of Current Condition/Mechanism of Injury: Chitra reports: "The last few days have been pretty good. I'm not 100%- but I'm almost there."     Falls: No    Involved Side: Right   Dominant Side: Right    Mechanism of Injury: Insidious onset of R wrist pain since Summer 2022  Surgical Procedure: 5/17/23 R first dorsal compartment release  Imaging: See Epic imaging report    Prior Therapy: OT from 1/31/23-3/27/23 at [facility]; DC due to plateau/lack of progress     Pain:  Functional Pain Scale Rating 0-10:   1/10 on average  0/10 at best  3/10 at worst  Location: R dorsolateral wrist   Description: Variable  Aggravating Factors: lifting  Easing Factors: rest    Occupation:  Retired     Functional Limitations/Social History:    Previous functional status includes: Modified Independent with all ADLs.     Current Functional Status   Home/Living environment: lives with their son    - 1 " story home, 2 steps to enter    - DME: N/A      Limitation of Functional Status as follows:   ADLs/IADLs:     - Feeding: Mod I     - Bathing: Mo d I     - Dressing/Grooming: Mod I     - Home Management: Mod I- SBA    - Driving: Mod I      Leisure: Crafts (ceramics)    Patient's Goals for Therapy: Return to PLOF     Past Medical History/Physical Systems Review:   Chitra Marie  has a past medical history of Depression, Essential (primary) hypertension, and Mixed hyperlipidemia.    Chitra Marie  has a past surgical history that includes Hysterectomy; Oophorectomy; Cholecystectomy (N/A, 1975); Total knee arthroplasty (Right, 2019); Arthroscopic repair of rotator cuff of shoulder (Left); and De Quervain's release (Right, 5/17/2023).    Chitra REBOLLAR has a current medication list which includes the following prescription(s): acetaminophen, acetaminophen, atorvastatin, atorvastatin, citalopram, ibuprofen, ramipril, tramadol, tramadol, and triamterene-hydrochlorothiazide 37.5-25 mg.    Review of patient's allergies indicates:  No Known Allergies     Objective     Observation/Appearance:  Skin intact and dry, scar site is mildly adhered and healing routinely     Edema. Measured in centimeters.   6/5/2023 6/5/2023    Right  Left    Proximal Wrist Crease 17.5 16   MCPs 17.7 17.2       Hand/Wrist ROM. Measured in degrees.   6/5/2023 6/5/2023    Right  Left         Thumb: MP 0/40 0/45                IP 0/70 0/70       Rad ADD/ABD 55 55       Pal ADD/ABD 45 45          Opposition 0 cm from 5th volar MP 0 cm from 5th volar MP     Wrist Flex/Ext: 65/45   (L: 60/45)  Wrist RD/UD: 15/35    (L: 15/35)       Sensation  Radial Nerve Distribution 6/5/2023 6/5/2023    Right  Left    [place] Ryan     Normal 1.65-2.83 X X        Strength (Dyanmometer) and Pinch Strength (Pinch Gauge)  Measured in pounds and psi. Average of three trials.; deferred for R hand at this time    6/5/2023 6/5/2023    Right  Left    Rung II 35.7  31.3   [place] Pinch 14 12   3pt Pinch 10 10   2pt Pinch 10 8       Manual Muscle Testing; deferred at this time         CMS Impairment/Limitation/Restriction for FOTO Wrist/hand Survey    Therapist reviewed FOTO scores for Chitra Marie on 6/5/2023.   FOTO documents entered into [employer] - see Media section.    Limitation Score: 47%  Category: ADL's          Treatment     Total Treatment time separate from Evaluation time:10 minutes     Chitra received the treatments listed below:      Therapeutic activities (as part of HEP) to improve functional performance for 10 minutes, including:  HEP provision: scar massage and edema control techniques      Patient Education and Home Exercises      Education provided:   -Role of OT, goals for OT, scheduling/cancellations, insurance limitations with patient.    Assessment     Chitra Marie is a 68 y.o. female referred to Outpatient Occupational Therapy and presents with a medical diagnosis of M65.4 (ICD-10-CM) De Quervain's tenosynovitis, right & Z98.890 (ICD-10-CM) - Postoperative state.    Following medical record review it is determined that Pt will benefit from Occupational Therapy services in order to maximize pain free and/or functional use of right wrist/hand. The following goals were discussed with the Patient and Patient is in agreement with them as to be addressed in the treatment plan. The Patient's rehab potential is Good.     Anticipated barriers to Occupational Therapy: None noted     Plan of care discussed with patient: Yes  Patient's spiritual, cultural and educational needs considered and patient is agreeable to the plan of care and goals as stated below:     Medical Necessity is demonstrated by the following  Occupational Profile/History  Co-morbidities and personal factors that may impact the plan of care [] LOW: Brief chart review  [x] MODERATE: Expanded chart review   [] HIGH: Extensive chart review    Moderate / High Support Documentation: post-op  chart review     Examination  Performance deficits relating to physical, cognitive or psychosocial skills that result in activity limitations and/or participation restrictions  [] LOW: addressing 1-3 Performance deficits  [x] MODERATE: 3-5 Performance deficits  [] HIGH: 5+ Performance deficits (please support below)    Moderate / High Support Documentation:    Physical:  Joint Mobility  Edema   Strength  Pinch Strength  Pain    Cognitive:  No Deficits    Psychosocial:    No Deficits     Treatment Options [x] LOW: Limited options  [] MODERATE: Several options  [] HIGH: Multiple options      Decision Making/ Complexity Score: moderate       The following goals were discussed with the patient and patient is in agreement with them as to be addressed in the treatment plan.       ShortTerm Goals (to be met in 1 week or by 7/12/23):   1. Patient to be IND and compliant with HEP & attendance for duration of therapy.  2. Patient will report no more than 2/10 pain in R wrist/hand.       Long Term Goals (to be met in 2 weeks or by DC):   1. Patient to be IND and compliant with HEP & attendance for duration of therapy.  2. Patient will demonstrate increased R hand  strength by 3-5 lbs. to restore functional grasp for ADLs/work/leisure activities.   3. Patient will report increase in functional use of  R hand by 20% as evidenced by the FOTO outcome measure.   4. Patient will report no more than 1/10 pain in R wrist/hand.   5. Patient will demonstrate increased R isolated wrist/thumb planes to 4+/5 to facilitate performance of leisure-related tasks at [facility].     Plan   Certification Period/Plan of care expiration: 6/5/2023 to 7/3/23.    Outpatient Occupational Therapy 1 times weekly for 2 weeks to include the following interventions: Paraffin, Fluidotherapy, Modalities for pain management, US 3 mhz, Therapeutic exercises/activities., Strengthening, Edema Control, and Scar Management.        Thank you for allowing me to  assist in the care of your Patient. If you have any questions or concerns, please don't hesitate to e-mail or contact me directly.      MARY Palacios/L  [facility]   Phone: [phone]  Fax: [phone]

## 2023-06-06 NOTE — PROGRESS NOTES
"[facility]  Occupational Therapy Treatment Note     Date: 6/7/2023  Name: Chitra Marie  Clinic Number: [id number]    Therapy Diagnosis:   Encounter Diagnoses   Name Primary?    De Quervain's tenosynovitis, right Yes    Postoperative state     Localized edema     Scar      Physician: Sagar Echeverria MD/Russo-Digeorge, Jamie L*    Physician Orders: Eval and treat   Medical Diagnosis: M65.4 (ICD-10-CM) - De Quervain's tenosynovitis, right Z98.890 (ICD-10-CM) - Postoperative state  Surgical Procedure and Date:  5/17/23, R first dorsal compartment release  Evaluation Date: 6/5/23  Insurance Authorization Period Expiration: 12/31/23  Plan of Care Expiration: 7/3/23; 2 visits on POC   Progress Note Due: 7/3/23   Date of Return to MD: TBD  Visit # / Visits authorized: 1/20   FOTO: Initial evaluation 47%    Precautions:  Standard  Orthopedic Precautions: Pt is currently 3 weeks post-op     Time In: 13:05  Time Out: 13:45  Total Billable Time: 40 minutes    Subjective     Pt reports: "I just feel pulling near my scar site."     She was compliant with home exercise program given last session. N/A    Response to previous treatment:N/A    Functional change: "I ironed 10 shirts the other day."     Pain: 0/10 resting pain; 1/10 w/ palpation of scar incision sites   Location: right dorsolateral wrist      Objective     Objective Measures updated at progress report unless specified.    Treatment     Chitra received the treatments listed below:      Manual therapy techniques: Joint mobilizations, Myofacial release, and Manual Lymphatic Drainage were applied to the: R wrist/hand for 15 minutes, including:  -Scar massage to R dorsolateral wrist for 10 minutes with mini vibrator to decrease dense adhesions and improve tensile glide      Therapeutic activities (as part of HEP provided today) to improve functional performance for 25 minutes, including:  - Review of scar massage technique x 5 min   -CMC " "joint stabilization: palmar ABD w/ "C" x 15 reps   - AAROM for radial/ulnar deviaton  - Upcoming Theraputty (red) for  strengthening (4-5 weeks post-op)   - Review of activity mods at this time     Patient Education and Home Exercises     Education provided:   - Importance of compliance w/ HEP to maximize gains   - Progress towards goals     Written Home Exercises Provided: yes.  Exercises were reviewed and Chitra was able to demonstrate them prior to the end of the session.  Chitra demonstrated good  understanding of the HEP provided. See EMR under Patient Instructions for exercises provided during today's therapy session.       Assessment     Pt would continue to benefit from skilled OT. Pt tolerated session wel, noting good understanding of all education provided today as part of post-op HEP.     Chitra is progressing well towards her goals and there are no updates to goals at this time. Pt prognosis is Good.     Pt will continue to benefit from skilled Outpatient Occupational Therapy to address the deficits listed in the problem list on initial evaluation provide Pt/family education and to maximize Pt's level of independence in the home and community environment.     Pt's spiritual, cultural and educational needs considered and pt agreeable to plan of care and goals.    Anticipated barriers to occupational therapy: None noted     ShortTerm Goals (to be met in 1 week or by 7/12/23):   1. Patient to be IND and compliant with HEP & attendance for duration of therapy.  2. Patient will report no more than 2/10 pain in R wrist/hand.         Long Term Goals (to be met in 2 weeks or by DC):   1. Patient to be IND and compliant with HEP & attendance for duration of therapy.  2. Patient will demonstrate increased R hand  strength by 3-5 lbs. to restore functional grasp for ADLs/work/leisure activities.   3. Patient will report increase in functional use of  R hand by 20% as evidenced by the FOTO outcome " measure.   4. Patient will report no more than 1/10 pain in R wrist/hand.   5. Patient will demonstrate increased R isolated wrist/thumb planes to 4+/5 to facilitate performance of leisure-related tasks at       Plan     Patient is to be treated by Occupational Therapy 1 time per week for 2 weeks, or 2 visits, during the certification period from 6/5/23 to 7/3/23 to achieve the established goals.       Updates/Grading for next session: Re-Assesssment       YISEL Palacios

## 2023-06-07 ENCOUNTER — CLINICAL SUPPORT (OUTPATIENT)
Dept: REHABILITATION | Facility: HOSPITAL | Age: 68
End: 2023-06-07
Attending: PHYSICIAN ASSISTANT
Payer: MEDICARE

## 2023-06-07 DIAGNOSIS — R60.0 LOCALIZED EDEMA: ICD-10-CM

## 2023-06-07 DIAGNOSIS — Z98.890 POSTOPERATIVE STATE: ICD-10-CM

## 2023-06-07 DIAGNOSIS — L90.5 SCAR: ICD-10-CM

## 2023-06-07 DIAGNOSIS — M65.4 DE QUERVAIN'S TENOSYNOVITIS, RIGHT: Primary | ICD-10-CM

## 2023-06-07 PROCEDURE — 97530 THERAPEUTIC ACTIVITIES: CPT | Mod: PN

## 2023-06-07 PROCEDURE — 97140 MANUAL THERAPY 1/> REGIONS: CPT | Mod: PN

## 2023-06-12 NOTE — PROGRESS NOTES
"[facility]  Occupational Therapy Treatment Note     Date: 6/13/2023  Name: Chitra Marie  Clinic Number: [id number]    Therapy Diagnosis:   Encounter Diagnoses   Name Primary?    De Quervain's tenosynovitis, right Yes    Localized edema     Scar      Physician: Sagar Echeverria MD/Russo-Digeorge, Jamie L*    Physician Orders: Eval and treat   Medical Diagnosis: M65.4 (ICD-10-CM) - De Quervain's tenosynovitis, right Z98.890 (ICD-10-CM) - Postoperative state  Surgical Procedure and Date:  5/17/23, R first dorsal compartment release  Evaluation Date: 6/5/23  Insurance Authorization Period Expiration: 12/31/23  Plan of Care Expiration: 7/3/23; 2 visits on POC   Progress Note Due: 7/3/23   Date of Return to MD: TBD  Visit # / Visits authorized: 2/20   FOTO: Initial evaluation 47%, 3rd visit         Precautions:  Standard  Orthopedic Precautions: Pt is currently 4 weeks post-op     Time In: 10:50  Time Out: 11:30  Total Billable Time: 40 minutes    Subjective     Pt reports: "My wrist has been dong really well. I can carry certain bags depending on the weight of the bag."     She was compliant with home exercise program given last session.     Response to previous treatment:    Functional change: "I carried a TV the other day."     Pain: 0/10 resting pain; 1/10 w/ palpation of scar incision sites   Location: right dorsolateral wrist      Objective     Objective Measures updated at progress report unless specified.       CMS Impairment/Limitation/Restriction for FOTO Wrist/hand Survey     Therapist reviewed FOTO scores for Chitra Marie on 6/13/2023.   FOTO documents entered into [employer] - see Media section.     Limitation Score: 45% (2% improvement)   Category: ADL's            Treatment     Chitra received the treatments listed below:      -2nd FOTO administered x 8 minutes     Direct contact modalities after being cleared for contraindications:   -Utrasound for scar tissue remodeling, " "increased circulation, & tissue elasticity @ 100% duty cycle, 3.3 Mhz, applied to R dorsolateral thumb/wrist, intensity = 0.6 w/cm2 for 8 minutes. Patient tolerated treatment well without adverse effects. Therapist was in attendance throughout intervention.    Manual therapy techniques: Joint mobilizations, Myofacial release, and Manual Lymphatic Drainage were applied to the: R wrist/hand for 15 minutes, including:  -Scar massage to R dorsolateral wrist for 10 minutes with mini vibrator to decrease dense adhesions and improve tensile glide      Therapeutic activities (as part of HEP provided today) to improve functional performance for 9 minutes, including:  -Wrist isometrics w/ 1-2# free weight x 10 reps, 30 second hold   -CMC joint stabilization: palmar ABD w/ "C" x 15 reps     Patient Education and Home Exercises     Education provided:   - Importance of compliance w/ HEP to maximize gains   - Progress towards goals     Written Home Exercises Provided: Continue w/ previous HEP.   Exercises were reviewed and Chitra was able to demonstrate them prior to the end of the session.  Chitra demonstrated good  understanding of the HEP provided. See EMR under Patient Instructions for exercises provided during previous therapy sessions.       Assessment     Pt would continue to benefit from skilled OT. Pt tolerated session well, noting minimal to no wrist pain reported over the past week despite increased lifting and overall use of her R hand with daily household management tasks.   Chitra is progressing well towards her goals and there are no updates to goals at this time. Pt prognosis is Good.     Pt will continue to benefit from skilled Outpatient Occupational Therapy to address the deficits listed in the problem list on initial evaluation provide Pt/family education and to maximize Pt's level of independence in the home and community environment.     Pt's spiritual, cultural and educational needs considered and " pt agreeable to plan of care and goals.    Anticipated barriers to occupational therapy: None noted     ShortTerm Goals (to be met in 1 week or by 6/12/23):   1. Patient to be IND and compliant with HEP & attendance for duration of therapy.Goal Met 6/13  2. Patient will report no more than 2/10 pain in R wrist/hand. Goal Met 6/13        Long Term Goals (to be met in 2 weeks or by DC):   1. Patient to be IND and compliant with HEP & attendance for duration of therapy.  2. Patient will demonstrate increased R hand  strength by 3-5 lbs. to restore functional grasp for ADLs/work/leisure activities.   3. Patient will report increase in functional use of  R hand by 20% as evidenced by the FOTO outcome measure. In progress 6/13  4. Patient will report no more than 1/10 pain in R wrist/hand.   5. Patient will demonstrate increased R isolated wrist/thumb planes to 4+/5 to facilitate performance of leisure-related tasks at       Plan     Patient is to be treated by Occupational Therapy 1 time per week for 2 weeks, or 2 visits, during the certification period from 6/5/23 to 7/3/23 to achieve the established goals.       Updates/Grading for next session: Re-Assesssment       YISEL Palacios

## 2023-06-13 ENCOUNTER — CLINICAL SUPPORT (OUTPATIENT)
Dept: REHABILITATION | Facility: HOSPITAL | Age: 68
End: 2023-06-13
Attending: PHYSICIAN ASSISTANT
Payer: MEDICARE

## 2023-06-13 DIAGNOSIS — M65.4 DE QUERVAIN'S TENOSYNOVITIS, RIGHT: Primary | ICD-10-CM

## 2023-06-13 DIAGNOSIS — L90.5 SCAR: ICD-10-CM

## 2023-06-13 DIAGNOSIS — R60.0 LOCALIZED EDEMA: ICD-10-CM

## 2023-06-13 PROCEDURE — 97035 APP MDLTY 1+ULTRASOUND EA 15: CPT | Mod: PN

## 2023-06-13 PROCEDURE — 97140 MANUAL THERAPY 1/> REGIONS: CPT | Mod: PN

## 2023-06-13 PROCEDURE — 97530 THERAPEUTIC ACTIVITIES: CPT | Mod: PN

## 2023-06-27 ENCOUNTER — OFFICE VISIT (OUTPATIENT)
Dept: ORTHOPEDICS | Facility: CLINIC | Age: 68
End: 2023-06-27
Payer: MEDICARE

## 2023-06-27 DIAGNOSIS — Z98.890 POSTOPERATIVE STATE: ICD-10-CM

## 2023-06-27 DIAGNOSIS — M65.4 DE QUERVAIN'S TENOSYNOVITIS, RIGHT: Primary | ICD-10-CM

## 2023-06-27 PROCEDURE — 99024 POSTOP FOLLOW-UP VISIT: CPT | Mod: S$GLB,,, | Performed by: PHYSICIAN ASSISTANT

## 2023-06-27 PROCEDURE — 1101F PR PT FALLS ASSESS DOC 0-1 FALLS W/OUT INJ PAST YR: ICD-10-PCS | Mod: CPTII,S$GLB,, | Performed by: PHYSICIAN ASSISTANT

## 2023-06-27 PROCEDURE — 99999 PR PBB SHADOW E&M-EST. PATIENT-LVL II: ICD-10-PCS | Mod: PBBFAC,,, | Performed by: PHYSICIAN ASSISTANT

## 2023-06-27 PROCEDURE — 3288F PR FALLS RISK ASSESSMENT DOCUMENTED: ICD-10-PCS | Mod: CPTII,S$GLB,, | Performed by: PHYSICIAN ASSISTANT

## 2023-06-27 PROCEDURE — 3288F FALL RISK ASSESSMENT DOCD: CPT | Mod: CPTII,S$GLB,, | Performed by: PHYSICIAN ASSISTANT

## 2023-06-27 PROCEDURE — 1159F MED LIST DOCD IN RCRD: CPT | Mod: CPTII,S$GLB,, | Performed by: PHYSICIAN ASSISTANT

## 2023-06-27 PROCEDURE — 99024 PR POST-OP FOLLOW-UP VISIT: ICD-10-PCS | Mod: S$GLB,,, | Performed by: PHYSICIAN ASSISTANT

## 2023-06-27 PROCEDURE — 1101F PT FALLS ASSESS-DOCD LE1/YR: CPT | Mod: CPTII,S$GLB,, | Performed by: PHYSICIAN ASSISTANT

## 2023-06-27 PROCEDURE — 99999 PR PBB SHADOW E&M-EST. PATIENT-LVL II: CPT | Mod: PBBFAC,,, | Performed by: PHYSICIAN ASSISTANT

## 2023-06-27 PROCEDURE — 1126F AMNT PAIN NOTED NONE PRSNT: CPT | Mod: CPTII,S$GLB,, | Performed by: PHYSICIAN ASSISTANT

## 2023-06-27 PROCEDURE — 4010F PR ACE/ARB THEARPY RXD/TAKEN: ICD-10-PCS | Mod: CPTII,S$GLB,, | Performed by: PHYSICIAN ASSISTANT

## 2023-06-27 PROCEDURE — 1159F PR MEDICATION LIST DOCUMENTED IN MEDICAL RECORD: ICD-10-PCS | Mod: CPTII,S$GLB,, | Performed by: PHYSICIAN ASSISTANT

## 2023-06-27 PROCEDURE — 1126F PR PAIN SEVERITY QUANTIFIED, NO PAIN PRESENT: ICD-10-PCS | Mod: CPTII,S$GLB,, | Performed by: PHYSICIAN ASSISTANT

## 2023-06-27 PROCEDURE — 4010F ACE/ARB THERAPY RXD/TAKEN: CPT | Mod: CPTII,S$GLB,, | Performed by: PHYSICIAN ASSISTANT

## 2023-06-27 NOTE — PROGRESS NOTES
"[facility]  Occupational Therapy Progress/Treatment Note & Discharge Summary     Date: 6/28/2023  Name: Chitra Marie  Clinic Number: [id number]    Therapy Diagnosis:   Encounter Diagnoses   Name Primary?    De Quervain's tenosynovitis, right Yes    Localized edema     Scar      Physician: Sagar Echeverria MD/Russo-Digeorge, Jamie L*    Physician Orders: Eval and treat   Medical Diagnosis: M65.4 (ICD-10-CM) - De Quervain's tenosynovitis, right Z98.890 (ICD-10-CM) - Postoperative state  Surgical Procedure and Date:  5/17/23, R first dorsal compartment release  Evaluation Date: 6/5/23  Insurance Authorization Period Expiration: 12/31/23  Plan of Care Expiration: 7/3/23; 2 visits on POC   Progress Note Due: 7/3/23   Date of Return to MD: TBD  Visit # / Visits authorized: 3/20   FOTO: Initial evaluation 47%, 3rd visit, 4th visit/26%    Precautions:  Standard  Orthopedic Precautions: Pt is currently 6 weeks post-op     Time In: 10:52  Time Out: 11:30  Total Billable Time: 38 minutes    Subjective     Pt reports: "My wrist is totally fine. No more pain."     She was compliant with home exercise program given last session.     Response to previous treatment:    Functional change: No limitations with ADLs/IADLs     Pain: 0/10 resting pain; 0/10 w/ palpation of scar incision sites   Location: right dorsolateral wrist      Objective     Objective Measures updated at progress report unless specified.    Edema. Measured in centimeters.    6/5/2023 6/5/2023     Right  Left    Proximal Wrist Crease 17.0  (-0.5 cm)  16   MCPs 17.5  (-0.2 cm)  17.2         Hand/Wrist ROM. Measured in degrees.    6/5/2023 6/5/2023     Right  Left            Thumb: MP 0/45  (+5)  0/45                IP 0/70 0/70       Rad ADD/ABD 55 55       Pal ADD/ABD 45 45          Opposition 0 cm from 5th volar MP 0 cm from 5th volar MP      Wrist Flex/Ext: 75/55 (+10/+10)   (L: 60/45)  Wrist RD/UD: 15/35                     (L: 15/35) "         Sensation  Radial Nerve Distribution 6/5/2023 6/5/2023     Right  Left    [place] Ryan       Normal 1.65-2.83 X X          Strength (Dyanmometer) and Pinch Strength (Pinch Gauge)  Measured in pounds and psi. Average of three trials.; deferred for R hand at this time     6/5/2023 6/5/2023     Right  Left    Rung II 38.6 (+3) 31.3   [place] Pinch 14  (same)  12   3pt Pinch 10  (same) 10   2pt Pinch 10 8         Manual Muscle Testing;   Wrist flex: 5/5   Wrist ext: 5/5  Thumb MP flex: 5/5  Thumb IP flex: 5/5  Palmar ABD: 5/5  Radial ABD: 5/5           CMS Impairment/Limitation/Restriction for FOTO Wrist/hand Survey     Therapist reviewed FOTO scores for Chitra Marie on 6/28/2023.   FOTO documents entered into [employer] - see Media section.     Limitation Score: 26% (+21% improvement)   Category: ADL's         Treatment     Chitra received the treatments listed below:      Re-Assessment performed with measurements and report taken x 25 minutes      Manual therapy techniques: Joint mobilizations, Myofacial release, and Manual Lymphatic Drainage were applied to the: R wrist/hand for 13 minutes, including:  -Scar massage to R dorsolateral wrist with mini vibrator to decrease dense adhesions and improve tensile glide      Patient Education and Home Exercises     Education provided:   - Progress towards goals     Written Home Exercises Provided: Continue w/ previous HEP.   Exercises were reviewed and Chitra was able to demonstrate them prior to the end of the session.  Chitra demonstrated good  understanding of the HEP provided. See EMR under Patient Instructions for exercises provided during previous therapy sessions.       Assessment     Patient has met all LTG established for therapy, noting normative R wrist and thumb ROM, /pinch strength, and minimal to no pain reported since last treatment session. Pt reports she has returned to PLOF with all ADLs/IADLs.     Pt's spiritual, cultural and  educational needs considered and pt agreeable to plan of care and goals.      ShortTerm Goals (to be met in 1 week or by 6/12/23):   1. Patient to be IND and compliant with HEP & attendance for duration of therapy.Goal Met 6/13  2. Patient will report no more than 2/10 pain in R wrist/hand. Goal Met 6/13        Long Term Goals (to be met in 2 weeks or by DC):   1. Patient to be IND and compliant with HEP & attendance for duration of therapy. Goal Met 6/28  2. Patient will demonstrate increased R hand  strength by 3-5 lbs. to restore functional grasp for ADLs/work/leisure activities. Goal Met 6/28  3. Patient will report increase in functional use of  R hand by 20% as evidenced by the FOTO outcome measure. Goal Met 6/28  4. Patient will report no more than 1/10 pain in R wrist/hand. Goal Met 6/28  5. Patient will demonstrate increased R isolated wrist/thumb planes to 4+/5 to facilitate performance of leisure-related tasks at [facility] [address]. Goal Met 6/28      Plan     Patient is appropriate for discharge from Outpatient Occupational Therapy at this time and will continue at home with HEP.       YISEL Palacios

## 2023-06-27 NOTE — PROGRESS NOTES
Dr. Echeverria is the supervising physician for this encounter/patient    Chitra SMOOTH Marie presents for post-operative evaluation.  The patient is now 6 weeks s/p Right first dorsal compartment release with Dr. Echeverria on 5/17/23.  Overall the patient reports doing well.  She reports 0/10 pain, denies any f/c/s, no numbness. She is working with OT and finds she is improving nicely.    PE:    AA&O x 4.  NAD  HEENT:  NCAT, sclera nonicteric  Lungs:  Respirations are equal and unlabored.  CV:  2+ bilateral upper and lower extremity pulses.  MSK: The wound is well healed without any signs of infection. Full wrist/hand/Thumb motion intact, SILT. DNVI.    A/P: Status post above, doing well  1) OT for postop rehab ordered. activity as tolerated.  2) vigorous scar massage discussed.  3) F/U as needed.  4) Call with any questions/concerns in the interim      Jamie Russo-DiGeorge PA-C

## 2023-06-28 ENCOUNTER — CLINICAL SUPPORT (OUTPATIENT)
Dept: REHABILITATION | Facility: HOSPITAL | Age: 68
End: 2023-06-28
Attending: PHYSICIAN ASSISTANT
Payer: MEDICARE

## 2023-06-28 DIAGNOSIS — L90.5 SCAR: ICD-10-CM

## 2023-06-28 DIAGNOSIS — M65.4 DE QUERVAIN'S TENOSYNOVITIS, RIGHT: Primary | ICD-10-CM

## 2023-06-28 DIAGNOSIS — R60.0 LOCALIZED EDEMA: ICD-10-CM

## 2023-06-28 PROCEDURE — 97110 THERAPEUTIC EXERCISES: CPT | Mod: PN

## 2023-06-28 PROCEDURE — 97530 THERAPEUTIC ACTIVITIES: CPT | Mod: PN

## 2023-09-06 ENCOUNTER — PATIENT MESSAGE (OUTPATIENT)
Dept: ADMINISTRATIVE | Facility: OTHER | Age: 68
End: 2023-09-06
Payer: MEDICARE

## (undated) DEVICE — NDL 22GA X1 1/2 REG BEVEL

## (undated) DEVICE — STOCKINETTE DBL PLY ST 4X

## (undated) DEVICE — DRAPE STERI-DRAPE 1000 17X11IN

## (undated) DEVICE — Device

## (undated) DEVICE — SUT VICRYL 4-0 RB1 27IN UD

## (undated) DEVICE — BANDAGE ESMARK ELASTIC ST 4X9

## (undated) DEVICE — GLOVE PI ULTRA TOUCH G SURGEON

## (undated) DEVICE — SUT 4-0 ETHILON 18 PS-2

## (undated) DEVICE — TOURNIQUET SB QC DP 18X4IN

## (undated) DEVICE — BANDAGE MATRIX HK LOOP 2IN 5YD

## (undated) DEVICE — GAUZE SPONGE 4X4 12PLY

## (undated) DEVICE — UNDERGLOVES BIOGEL PI SIZE 8

## (undated) DEVICE — PAD CAST SPECIALIST STRL 4

## (undated) DEVICE — PAD CAST 2 IN X 4YDS STERILE

## (undated) DEVICE — SPLINT PLASTER EXT FAST 4X15

## (undated) DEVICE — DRAPE U SPLIT SHEET 54X76IN

## (undated) DEVICE — CORD FOR BIPOLAR FORCEPS 12